# Patient Record
Sex: FEMALE | Race: WHITE | NOT HISPANIC OR LATINO | URBAN - METROPOLITAN AREA
[De-identification: names, ages, dates, MRNs, and addresses within clinical notes are randomized per-mention and may not be internally consistent; named-entity substitution may affect disease eponyms.]

---

## 2019-06-01 ENCOUNTER — OUTPATIENT (OUTPATIENT)
Dept: OUTPATIENT SERVICES | Facility: HOSPITAL | Age: 84
LOS: 1 days | End: 2019-06-01
Payer: MEDICARE

## 2019-06-01 PROCEDURE — G9001: CPT

## 2019-06-02 ENCOUNTER — INPATIENT (INPATIENT)
Facility: HOSPITAL | Age: 84
LOS: 2 days | Discharge: HOME | End: 2019-06-05
Attending: FAMILY MEDICINE | Admitting: FAMILY MEDICINE
Payer: MEDICARE

## 2019-06-02 VITALS
RESPIRATION RATE: 18 BRPM | WEIGHT: 100.09 LBS | TEMPERATURE: 98 F | HEART RATE: 65 BPM | SYSTOLIC BLOOD PRESSURE: 95 MMHG | OXYGEN SATURATION: 100 % | HEIGHT: 60 IN | DIASTOLIC BLOOD PRESSURE: 45 MMHG

## 2019-06-02 LAB
ALBUMIN SERPL ELPH-MCNC: 2.9 G/DL — LOW (ref 3.3–5)
ALP SERPL-CCNC: 108 U/L — SIGNIFICANT CHANGE UP (ref 40–120)
ALT FLD-CCNC: 18 U/L — SIGNIFICANT CHANGE UP (ref 12–78)
ANION GAP SERPL CALC-SCNC: 7 MMOL/L — SIGNIFICANT CHANGE UP (ref 5–17)
APPEARANCE UR: ABNORMAL
APTT BLD: 30.2 SEC — SIGNIFICANT CHANGE UP (ref 27.5–36.3)
AST SERPL-CCNC: 13 U/L — LOW (ref 15–37)
BASOPHILS # BLD AUTO: 0.04 K/UL — SIGNIFICANT CHANGE UP (ref 0–0.2)
BASOPHILS NFR BLD AUTO: 0.4 % — SIGNIFICANT CHANGE UP (ref 0–2)
BILIRUB SERPL-MCNC: 0.4 MG/DL — SIGNIFICANT CHANGE UP (ref 0.2–1.2)
BILIRUB UR-MCNC: NEGATIVE — SIGNIFICANT CHANGE UP
BUN SERPL-MCNC: 19 MG/DL — SIGNIFICANT CHANGE UP (ref 7–23)
CALCIUM SERPL-MCNC: 9.3 MG/DL — SIGNIFICANT CHANGE UP (ref 8.5–10.1)
CHLORIDE SERPL-SCNC: 104 MMOL/L — SIGNIFICANT CHANGE UP (ref 96–108)
CK SERPL-CCNC: 30 U/L — SIGNIFICANT CHANGE UP (ref 26–192)
CO2 SERPL-SCNC: 30 MMOL/L — SIGNIFICANT CHANGE UP (ref 22–31)
COLOR SPEC: YELLOW — SIGNIFICANT CHANGE UP
CREAT SERPL-MCNC: 0.78 MG/DL — SIGNIFICANT CHANGE UP (ref 0.5–1.3)
D DIMER BLD IA.RAPID-MCNC: 191 NG/ML DDU — SIGNIFICANT CHANGE UP
DIFF PNL FLD: NEGATIVE — SIGNIFICANT CHANGE UP
EOSINOPHIL # BLD AUTO: 0.11 K/UL — SIGNIFICANT CHANGE UP (ref 0–0.5)
EOSINOPHIL NFR BLD AUTO: 1.1 % — SIGNIFICANT CHANGE UP (ref 0–6)
GLUCOSE SERPL-MCNC: 81 MG/DL — SIGNIFICANT CHANGE UP (ref 70–99)
GLUCOSE UR QL: NEGATIVE MG/DL — SIGNIFICANT CHANGE UP
HCT VFR BLD CALC: 41.5 % — SIGNIFICANT CHANGE UP (ref 34.5–45)
HGB BLD-MCNC: 13.1 G/DL — SIGNIFICANT CHANGE UP (ref 11.5–15.5)
IMM GRANULOCYTES NFR BLD AUTO: 0.3 % — SIGNIFICANT CHANGE UP (ref 0–1.5)
INR BLD: 1.3 RATIO — HIGH (ref 0.88–1.16)
KETONES UR-MCNC: NEGATIVE — SIGNIFICANT CHANGE UP
LACTATE SERPL-SCNC: 2.4 MMOL/L — HIGH (ref 0.7–2)
LEUKOCYTE ESTERASE UR-ACNC: ABNORMAL
LYMPHOCYTES # BLD AUTO: 2.17 K/UL — SIGNIFICANT CHANGE UP (ref 1–3.3)
LYMPHOCYTES # BLD AUTO: 22.4 % — SIGNIFICANT CHANGE UP (ref 13–44)
MCHC RBC-ENTMCNC: 27.9 PG — SIGNIFICANT CHANGE UP (ref 27–34)
MCHC RBC-ENTMCNC: 31.6 GM/DL — LOW (ref 32–36)
MCV RBC AUTO: 88.5 FL — SIGNIFICANT CHANGE UP (ref 80–100)
MONOCYTES # BLD AUTO: 0.76 K/UL — SIGNIFICANT CHANGE UP (ref 0–0.9)
MONOCYTES NFR BLD AUTO: 7.8 % — SIGNIFICANT CHANGE UP (ref 2–14)
NEUTROPHILS # BLD AUTO: 6.59 K/UL — SIGNIFICANT CHANGE UP (ref 1.8–7.4)
NEUTROPHILS NFR BLD AUTO: 68 % — SIGNIFICANT CHANGE UP (ref 43–77)
NITRITE UR-MCNC: NEGATIVE — SIGNIFICANT CHANGE UP
NT-PROBNP SERPL-SCNC: 3081 PG/ML — HIGH (ref 0–450)
PH UR: 8 — SIGNIFICANT CHANGE UP (ref 5–8)
PLATELET # BLD AUTO: 274 K/UL — SIGNIFICANT CHANGE UP (ref 150–400)
POTASSIUM SERPL-MCNC: 4.5 MMOL/L — SIGNIFICANT CHANGE UP (ref 3.5–5.3)
POTASSIUM SERPL-SCNC: 4.5 MMOL/L — SIGNIFICANT CHANGE UP (ref 3.5–5.3)
PROT SERPL-MCNC: 6.9 GM/DL — SIGNIFICANT CHANGE UP (ref 6–8.3)
PROT UR-MCNC: NEGATIVE MG/DL — SIGNIFICANT CHANGE UP
PROTHROM AB SERPL-ACNC: 14.5 SEC — HIGH (ref 10–12.9)
RBC # BLD: 4.69 M/UL — SIGNIFICANT CHANGE UP (ref 3.8–5.2)
RBC # FLD: 16.5 % — HIGH (ref 10.3–14.5)
SODIUM SERPL-SCNC: 141 MMOL/L — SIGNIFICANT CHANGE UP (ref 135–145)
SP GR SPEC: 1.01 — SIGNIFICANT CHANGE UP (ref 1.01–1.02)
TROPONIN I SERPL-MCNC: <0.015 NG/ML — SIGNIFICANT CHANGE UP (ref 0.01–0.04)
TROPONIN I SERPL-MCNC: <0.015 NG/ML — SIGNIFICANT CHANGE UP (ref 0.01–0.04)
UROBILINOGEN FLD QL: NEGATIVE MG/DL — SIGNIFICANT CHANGE UP
WBC # BLD: 9.7 K/UL — SIGNIFICANT CHANGE UP (ref 3.8–10.5)
WBC # FLD AUTO: 9.7 K/UL — SIGNIFICANT CHANGE UP (ref 3.8–10.5)

## 2019-06-02 PROCEDURE — 99285 EMERGENCY DEPT VISIT HI MDM: CPT

## 2019-06-02 PROCEDURE — 71045 X-RAY EXAM CHEST 1 VIEW: CPT | Mod: 26

## 2019-06-02 PROCEDURE — 93010 ELECTROCARDIOGRAM REPORT: CPT

## 2019-06-02 PROCEDURE — 70450 CT HEAD/BRAIN W/O DYE: CPT | Mod: 26

## 2019-06-02 PROCEDURE — 71250 CT THORAX DX C-: CPT | Mod: 26

## 2019-06-02 RX ORDER — CEFTRIAXONE 500 MG/1
1000 INJECTION, POWDER, FOR SOLUTION INTRAMUSCULAR; INTRAVENOUS ONCE
Refills: 0 | Status: COMPLETED | OUTPATIENT
Start: 2019-06-02 | End: 2019-06-02

## 2019-06-02 RX ORDER — CEFTRIAXONE 500 MG/1
1000 INJECTION, POWDER, FOR SOLUTION INTRAMUSCULAR; INTRAVENOUS EVERY 24 HOURS
Refills: 0 | Status: DISCONTINUED | OUTPATIENT
Start: 2019-06-03 | End: 2019-06-04

## 2019-06-02 RX ORDER — VANCOMYCIN HCL 1 G
750 VIAL (EA) INTRAVENOUS ONCE
Refills: 0 | Status: COMPLETED | OUTPATIENT
Start: 2019-06-02 | End: 2019-06-02

## 2019-06-02 RX ORDER — AZITHROMYCIN 500 MG/1
500 TABLET, FILM COATED ORAL ONCE
Refills: 0 | Status: COMPLETED | OUTPATIENT
Start: 2019-06-02 | End: 2019-06-02

## 2019-06-02 RX ORDER — AZITHROMYCIN 500 MG/1
500 TABLET, FILM COATED ORAL EVERY 24 HOURS
Refills: 0 | Status: DISCONTINUED | OUTPATIENT
Start: 2019-06-03 | End: 2019-06-04

## 2019-06-02 RX ORDER — ONDANSETRON 8 MG/1
4 TABLET, FILM COATED ORAL EVERY 6 HOURS
Refills: 0 | Status: DISCONTINUED | OUTPATIENT
Start: 2019-06-02 | End: 2019-06-05

## 2019-06-02 RX ORDER — CEFTRIAXONE 500 MG/1
INJECTION, POWDER, FOR SOLUTION INTRAMUSCULAR; INTRAVENOUS
Refills: 0 | Status: DISCONTINUED | OUTPATIENT
Start: 2019-06-02 | End: 2019-06-04

## 2019-06-02 RX ORDER — AZITHROMYCIN 500 MG/1
TABLET, FILM COATED ORAL
Refills: 0 | Status: DISCONTINUED | OUTPATIENT
Start: 2019-06-02 | End: 2019-06-04

## 2019-06-02 RX ORDER — SODIUM CHLORIDE 9 MG/ML
1400 INJECTION, SOLUTION INTRAVENOUS ONCE
Refills: 0 | Status: COMPLETED | OUTPATIENT
Start: 2019-06-02 | End: 2019-06-02

## 2019-06-02 RX ORDER — CEFEPIME 1 G/1
1000 INJECTION, POWDER, FOR SOLUTION INTRAMUSCULAR; INTRAVENOUS ONCE
Refills: 0 | Status: COMPLETED | OUTPATIENT
Start: 2019-06-02 | End: 2019-06-02

## 2019-06-02 RX ORDER — SODIUM CHLORIDE 9 MG/ML
1000 INJECTION INTRAMUSCULAR; INTRAVENOUS; SUBCUTANEOUS
Refills: 0 | Status: DISCONTINUED | OUTPATIENT
Start: 2019-06-02 | End: 2019-06-02

## 2019-06-02 RX ORDER — SENNA PLUS 8.6 MG/1
2 TABLET ORAL AT BEDTIME
Refills: 0 | Status: DISCONTINUED | OUTPATIENT
Start: 2019-06-02 | End: 2019-06-05

## 2019-06-02 RX ORDER — CEFTRIAXONE 500 MG/1
INJECTION, POWDER, FOR SOLUTION INTRAMUSCULAR; INTRAVENOUS
Refills: 0 | Status: DISCONTINUED | OUTPATIENT
Start: 2019-06-02 | End: 2019-06-02

## 2019-06-02 RX ADMIN — CEFEPIME 1000 MILLIGRAM(S): 1 INJECTION, POWDER, FOR SOLUTION INTRAMUSCULAR; INTRAVENOUS at 16:17

## 2019-06-02 RX ADMIN — Medication 200 MILLIGRAM(S): at 22:58

## 2019-06-02 RX ADMIN — SODIUM CHLORIDE 1400 MILLILITER(S): 9 INJECTION, SOLUTION INTRAVENOUS at 18:15

## 2019-06-02 RX ADMIN — Medication 250 MILLIGRAM(S): at 16:16

## 2019-06-02 RX ADMIN — SODIUM CHLORIDE 1400 MILLILITER(S): 9 INJECTION, SOLUTION INTRAVENOUS at 15:45

## 2019-06-02 RX ADMIN — Medication 750 MILLIGRAM(S): at 17:30

## 2019-06-02 NOTE — H&P ADULT - NSHPPHYSICALEXAM_GEN_ALL_CORE
PHYSICAL EXAM:    Constitutional: NAD, awake and alert, well-developed  HEENT: PERR, EOMI, Normal Hearing, MMM  Neck: Soft and supple, No LAD, No JVD  Respiratory: Breath sounds decreased at bases,  No wheezing, rales or rhonchi  Cardiovascular: S1 and S2, regular rate and rhythm, no Murmurs, gallops or rubs  Gastrointestinal: Bowel Sounds normal,  soft, nontender, nondistended, no guarding, no rebound  Extremities: No peripheral edema  Vascular: 2+ peripheral pulses  Neurological: A/O x 1 ( name ) , no focal deficits  Musculoskeletal: 5/5 strength b/l upper and lower extremities  Skin: No rashes  rectal : deferred, not indicated

## 2019-06-02 NOTE — ED ADULT TRIAGE NOTE - CHIEF COMPLAINT QUOTE
Pt BIBA s/p syncope & cough, pt family states cough has worsened. Pt states she "feels warm" denies chest pain or shortness of breath.

## 2019-06-02 NOTE — ED PROVIDER NOTE - CLINICAL SUMMARY MEDICAL DECISION MAKING FREE TEXT BOX
89 y/o female PMHx of UTI s/p PNA 02/2019 BIBA from home after aide noted episode decreased alertness. ?syncope. Pt poor historian/poorly able to communicate though does occasional speak English with fluent speech. +tremors. ?rigors. Noted hypotensive upon ED arrival. Sepsis alert initiated. Plan: rectal temp, EKG, CXR, CT head, sepsis labs including PAN cultures, serum lactate, serial troponin, sepsis IV fluids, straight cath, monitor, observe, reassess.

## 2019-06-02 NOTE — ED ADULT NURSE REASSESSMENT NOTE - NS ED NURSE REASSESS COMMENT FT1
pt cleaned, turned, and repositioned at this time. pt has multiple healed ulcers to sacral area, blanchable redness to b/l buttock and sacrum and 0.5x 0.5 sacral stage I present at this time. pt calm and cooperative. resting in stretcher. no complaints. awaiting transfer to admission unit at this time.

## 2019-06-02 NOTE — H&P ADULT - ASSESSMENT
89 y/o female as per ED attending with PMHx of dementia , HTN ? if on  any medications, h/o PNA, UTIs presents to the ED BIBEMS from home s/p episode of lethargy this morning. Pt was with her aide while eating breakfast when aide noticed pt looked sluggish, lethargic and drooped at the table. +recent worsening cough. No LOC. EMS reports BP systolic in 70s. Lethargy self resolved. Now pt c/o "feeling cold". Recently admitted at [facility] in 02/2019 for PNA. Hx unobtainable, pt verbal but poor historian.  Patient seen and examined in ED, no relatives at bedside, no contact information, home phone number in the records out of the service, unable to obtain any more information.     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  - check TSH, B12  -monitor  - check orthostatics , PT evaluation    * Cough due to  Sepsis due to acute PNA , CAP   - CXR - noted, will do CT chest for better evaluation   - s/p cefepime and Vanco in ED  - last hospitalization in February   - will continue ceftriaxone and azithro  - ID consult  - mucolytics    * Hypotension due to sepsis, resolved  - s/p IV fluids resuscitation  - start diet  - monitor Bp  - elevated bnp, check 2 d echo  - repeat troponin in am    * Atrial flutter  - CHADS score - 2 , moderate risk   - cardiology consult  - not on AC , unable to obtain any further history at this time     advanced directives  - called home phone number - the number is not in service, no family members available to discuss

## 2019-06-02 NOTE — ED PROVIDER NOTE - OBJECTIVE STATEMENT
89 y/o female with PMHx of HTN, PNA, UTIs presents to the ED BIBEMS from home s/p episode of lethargy this morning. Pt was with her aide while eating breakfast when aide noticed pt looked sluggish, lethargic and drooped at the table. No LOC. EMS reports BP systolic in 70s. Lethargy self resolved. Now pt c/o "feeling cold". Recently admitted at [facility] in 02/2019 for PNA. Hx unobtainable, pt verbal but poorly communicative. 89 y/o female with PMHx of HTN, PNA, UTIs presents to the ED BIBEMS from home s/p episode of lethargy this morning. Pt was with her aide while eating breakfast when aide noticed pt looked sluggish, lethargic and drooped at the table. +recent worsening cough. No LOC. EMS reports BP systolic in 70s. Lethargy self resolved. Now pt c/o "feeling cold". Recently admitted at [facility] in 02/2019 for PNA. Hx unobtainable, pt verbal but poorly communicative.

## 2019-06-02 NOTE — ED ADULT NURSE NOTE - OBJECTIVE STATEMENT
Pt presents to ED from home, via EMS, pt alert and oriented to self, as per family pt had episode of eyes rolling back while sitting and becoming momentarily unresponsive. pt has also had a cough, pt resting in bed, neros intact, responsive to verbal stimuli, pt has home healht aid,safety maintained will continue to monitor

## 2019-06-02 NOTE — H&P ADULT - HISTORY OF PRESENT ILLNESS
89 y/o female as per ED attending with PMHx of dementia , HTN ? if on  any medications, h/o PNA, UTIs presents to the ED BIBEMS from home s/p episode of lethargy this morning. Pt was with her aide while eating breakfast when aide noticed pt looked sluggish, lethargic and drooped at the table. +recent worsening cough. No LOC. EMS reports BP systolic in 70s. Lethargy self resolved. Now pt c/o "feeling cold". Recently admitted at [facility] in 02/2019 for PNA. Hx unobtainable, pt verbal but poor historian.  Patient seen and examined in ED, no relatives at bedside, no contact information, home phone number in the records out of the service, unable to obtain any more information.     In ED -  T(F): 98.2  Max: 98.2 HR: 66 (61 - 66) BP: 120/71  (95/45 - 145/93) RR: 16  (15 - 18) SpO2: 99%  (99% - 100%)  EKG - atrial flutter , troponin x 2 neg, CT head  - moderate cerebrovascular changes , no acute stroke, CXR - slight left base infiltrate  Lactate, Blood 2.4 mmol/L  --> 2.1 s/p IV fluids, Cefepime and Vanco in ED

## 2019-06-02 NOTE — ED PROVIDER NOTE - CARE PLAN
Principal Discharge DX:	HCAP (healthcare-associated pneumonia)  Secondary Diagnosis:	Sepsis, due to unspecified organism  Secondary Diagnosis:	Hypotensive episode

## 2019-06-02 NOTE — ED PROVIDER NOTE - CARDIAC, MLM
Normal rate, regular rhythm.  Heart sounds S1, S2.  No murmurs, rubs or gallops. radial pulse likely diminished

## 2019-06-02 NOTE — ED PROVIDER NOTE - NEUROLOGICAL, MLM
awake, speaking English with no obvious aphasia, +inconsistently verbal, +poorly follows commands, +tremulous

## 2019-06-02 NOTE — ED ADULT NURSE REASSESSMENT NOTE - NS ED NURSE REASSESS COMMENT FT1
Family present at time of pt arrival but quickly left without providing information about contacting them or regarding pt hx or medications. MD isaacs

## 2019-06-02 NOTE — ED PROVIDER NOTE - PROGRESS NOTE DETAILS
Pt examined at bedside with Dr. Tyler. interview with patient's son prior to examination notes pt was eating breakfast at home with home health aid. Pt became lethargic and 911 was activated. EMS reported systolic BP in 70s enroute. Son states pt was recently admitted to [facility] in 2/2019 for PNA and UTI. Pt's son is Bernardo Mcfadden. may be contacted at [phone]. - Marvel Nj PA-C Lactate 2.4. LLL infiltrate noted on XR. UA pending. BP improved to 145/93. Will repeat lactate. Likely admission for sepsis 2/2 PNA. - Marvel Nj PA-C Dr. Tyler:  I have re-evaluated the patient's fluid status and reviewed vital signs. Clinical evaluation demonstrates an appropriate response to fluid resuscitation.

## 2019-06-02 NOTE — ED PROVIDER NOTE - MUSCULOSKELETAL, MLM
Spine appears normal, range of motion is not limited, no muscle or joint tenderness. no obvious CVA TTP. no gross deformity, good passive range of motion of large joints, no apparent pain

## 2019-06-03 LAB
ADD ON TEST-SPECIMEN IN LAB: SIGNIFICANT CHANGE UP
ANION GAP SERPL CALC-SCNC: 6 MMOL/L — SIGNIFICANT CHANGE UP (ref 5–17)
BASOPHILS # BLD AUTO: 0.03 K/UL — SIGNIFICANT CHANGE UP (ref 0–0.2)
BASOPHILS NFR BLD AUTO: 0.5 % — SIGNIFICANT CHANGE UP (ref 0–2)
BUN SERPL-MCNC: 13 MG/DL — SIGNIFICANT CHANGE UP (ref 7–23)
CALCIUM SERPL-MCNC: 9.5 MG/DL — SIGNIFICANT CHANGE UP (ref 8.5–10.1)
CHLORIDE SERPL-SCNC: 104 MMOL/L — SIGNIFICANT CHANGE UP (ref 96–108)
CHOLEST SERPL-MCNC: 179 MG/DL — SIGNIFICANT CHANGE UP (ref 10–199)
CK SERPL-CCNC: 38 U/L — SIGNIFICANT CHANGE UP (ref 26–192)
CO2 SERPL-SCNC: 28 MMOL/L — SIGNIFICANT CHANGE UP (ref 22–31)
CREAT SERPL-MCNC: 0.66 MG/DL — SIGNIFICANT CHANGE UP (ref 0.5–1.3)
CULTURE RESULTS: SIGNIFICANT CHANGE UP
D DIMER BLD IA.RAPID-MCNC: 209 NG/ML DDU — SIGNIFICANT CHANGE UP
EOSINOPHIL # BLD AUTO: 0.09 K/UL — SIGNIFICANT CHANGE UP (ref 0–0.5)
EOSINOPHIL NFR BLD AUTO: 1.4 % — SIGNIFICANT CHANGE UP (ref 0–6)
GLUCOSE SERPL-MCNC: 90 MG/DL — SIGNIFICANT CHANGE UP (ref 70–99)
HCT VFR BLD CALC: 43.5 % — SIGNIFICANT CHANGE UP (ref 34.5–45)
HDLC SERPL-MCNC: 56 MG/DL — SIGNIFICANT CHANGE UP
HGB BLD-MCNC: 13.7 G/DL — SIGNIFICANT CHANGE UP (ref 11.5–15.5)
IMM GRANULOCYTES NFR BLD AUTO: 0.3 % — SIGNIFICANT CHANGE UP (ref 0–1.5)
LACTATE SERPL-SCNC: 1.7 MMOL/L — SIGNIFICANT CHANGE UP (ref 0.7–2)
LIPID PNL WITH DIRECT LDL SERPL: 98 MG/DL — SIGNIFICANT CHANGE UP
LYMPHOCYTES # BLD AUTO: 1.96 K/UL — SIGNIFICANT CHANGE UP (ref 1–3.3)
LYMPHOCYTES # BLD AUTO: 29.9 % — SIGNIFICANT CHANGE UP (ref 13–44)
MAGNESIUM SERPL-MCNC: 2.1 MG/DL — SIGNIFICANT CHANGE UP (ref 1.6–2.6)
MCHC RBC-ENTMCNC: 28.1 PG — SIGNIFICANT CHANGE UP (ref 27–34)
MCHC RBC-ENTMCNC: 31.5 GM/DL — LOW (ref 32–36)
MCV RBC AUTO: 89.1 FL — SIGNIFICANT CHANGE UP (ref 80–100)
MONOCYTES # BLD AUTO: 0.43 K/UL — SIGNIFICANT CHANGE UP (ref 0–0.9)
MONOCYTES NFR BLD AUTO: 6.6 % — SIGNIFICANT CHANGE UP (ref 2–14)
NEUTROPHILS # BLD AUTO: 4.02 K/UL — SIGNIFICANT CHANGE UP (ref 1.8–7.4)
NEUTROPHILS NFR BLD AUTO: 61.3 % — SIGNIFICANT CHANGE UP (ref 43–77)
NT-PROBNP SERPL-SCNC: 3919 PG/ML — HIGH (ref 0–450)
PHOSPHATE SERPL-MCNC: 3.2 MG/DL — SIGNIFICANT CHANGE UP (ref 2.5–4.5)
PLATELET # BLD AUTO: 278 K/UL — SIGNIFICANT CHANGE UP (ref 150–400)
POTASSIUM SERPL-MCNC: 4.7 MMOL/L — SIGNIFICANT CHANGE UP (ref 3.5–5.3)
POTASSIUM SERPL-SCNC: 4.7 MMOL/L — SIGNIFICANT CHANGE UP (ref 3.5–5.3)
RBC # BLD: 4.88 M/UL — SIGNIFICANT CHANGE UP (ref 3.8–5.2)
RBC # FLD: 16.6 % — HIGH (ref 10.3–14.5)
SODIUM SERPL-SCNC: 138 MMOL/L — SIGNIFICANT CHANGE UP (ref 135–145)
SPECIMEN SOURCE: SIGNIFICANT CHANGE UP
TOTAL CHOLESTEROL/HDL RATIO MEASUREMENT: 3.2 RATIO — LOW (ref 3.3–7.1)
TRIGL SERPL-MCNC: 124 MG/DL — SIGNIFICANT CHANGE UP (ref 10–149)
TROPONIN I SERPL-MCNC: <0.015 NG/ML — SIGNIFICANT CHANGE UP (ref 0.01–0.04)
TSH SERPL-MCNC: 0.97 UU/ML — SIGNIFICANT CHANGE UP (ref 0.34–4.82)
VIT B12 SERPL-MCNC: 500 PG/ML — SIGNIFICANT CHANGE UP (ref 232–1245)
WBC # BLD: 6.55 K/UL — SIGNIFICANT CHANGE UP (ref 3.8–10.5)
WBC # FLD AUTO: 6.55 K/UL — SIGNIFICANT CHANGE UP (ref 3.8–10.5)

## 2019-06-03 PROCEDURE — 93306 TTE W/DOPPLER COMPLETE: CPT | Mod: 26

## 2019-06-03 PROCEDURE — 93010 ELECTROCARDIOGRAM REPORT: CPT

## 2019-06-03 RX ORDER — DILTIAZEM HCL 120 MG
60 CAPSULE, EXT RELEASE 24 HR ORAL ONCE
Refills: 0 | Status: COMPLETED | OUTPATIENT
Start: 2019-06-03 | End: 2019-06-03

## 2019-06-03 RX ADMIN — Medication 200 MILLIGRAM(S): at 05:21

## 2019-06-03 RX ADMIN — Medication 200 MILLIGRAM(S): at 22:44

## 2019-06-03 RX ADMIN — AZITHROMYCIN 255 MILLIGRAM(S): 500 TABLET, FILM COATED ORAL at 17:12

## 2019-06-03 RX ADMIN — CEFTRIAXONE 1000 MILLIGRAM(S): 500 INJECTION, POWDER, FOR SOLUTION INTRAMUSCULAR; INTRAVENOUS at 17:11

## 2019-06-03 RX ADMIN — Medication 200 MILLIGRAM(S): at 14:28

## 2019-06-03 NOTE — SWALLOW BEDSIDE ASSESSMENT ADULT - NS ASR SWALLOW FINDINGS DISCUS
Nursing/PT'S COGNITIVE DEFICITS ARE TOO SEVER FOR HER TO BE COUNSELED. Nursing/PT'S COGNITIVE DEFICITS ARE TOO SEVERE FOR HER TO BE COUNSELED.

## 2019-06-03 NOTE — SWALLOW BEDSIDE ASSESSMENT ADULT - SWALLOW EVAL: FEEDING ASSISTANCE
ASSISTANCE WAS NEEDED DUE TO FEED DUE TO THE SEVERITY OF HER BASELINE COGNITIVE DEFICITS DUE TO DEMENTIA. ASSISTANCE WAS NEEDED TO FEED DUE TO THE SEVERITY OF HER BASELINE COGNITIVE DEFICITS ASSOCIATED WITH DEMENTIA.

## 2019-06-03 NOTE — PHYSICAL THERAPY INITIAL EVALUATION ADULT - PERTINENT HX OF CURRENT PROBLEM, REHAB EVAL
Pt admitted to  secondary to lethargy and cough. Pt was admitted to [facility] 2/2019 secondary to PN.

## 2019-06-03 NOTE — PROGRESS NOTE ADULT - ASSESSMENT
89 y/o female as per ED attending with PMHx of dementia , HTN ? if on  any medications, h/o PNA, UTIs presents to the ED BIBEMS from home s/p episode of lethargy this morning. Pt was with her aide while eating breakfast when aide noticed pt looked sluggish, lethargic and drooped at the table. +recent worsening cough. No LOC. EMS reports BP systolic in 70s. Lethargy self resolved. Now pt c/o "feeling cold". Recently admitted at [facility] in 02/2019 for PNA. Hx unobtainable, pt verbal but poor historian.  Patient seen and examined in ED, no relatives at bedside, no contact information, home phone number in the records out of the service, unable to obtain any more information.     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 chf   - s/p IV fluids resuscitation  - start diet  - monitor Bp  - elevated bnp, check 2 d echo   - repeat troponin x3 neg     * Atrial flutter  - CHADS score - 2 , moderate risk   - cardiology consult  - not on AC , unable to obtain any further history at this time     advanced directives  - called home phone number - the number is not in service, no family members available to discuss

## 2019-06-03 NOTE — SWALLOW BEDSIDE ASSESSMENT ADULT - SWALLOW EVAL: CRITERIA FOR SKILLED INTERVENTION MET
DO NOT FEEL THAT ACUTE SPEECH PATHOLOGY INTERVENTION WOULD CHANGE CLINICAL MANAGEMENT/BE OF CLINICAL BENEFIT WHILE IN HOSPITAL. PT'S COGNITIVE DYSFUNCTION AND PRESBYPHAGIA ARE FELT TO BE CHRONIC PRE-EXISTING CONDITIONS. HER COMMUNICATIVE AND SWALLOWING INTEGRITY ARE FELT TO BE AT USUAL STATE/MAXIMIZED. PT WOULD NOT BE A VIABLE THERAPY CANDIDATE REGARDLESS GIVEN THE SEVERITY OF HER BASELINE COGNITIVE DEFICITS ASSOCIATED WITH DEMENTIA. GIVEN ABOVE, WILL NOT ACTIVELY FOLLOW. RECONSULT PRN.  A ;S OS DO NOT FEEL THAT ACUTE SPEECH PATHOLOGY INTERVENTION WOULD CHANGE CLINICAL MANAGEMENT/BE OF CLINICAL BENEFIT WHILE IN HOSPITAL. PT'S COGNITIVE DYSFUNCTION AND PRESBYPHAGIA ARE FELT TO BE CHRONIC PRE-EXISTING CONDITIONS. HER COMMUNICATIVE AND SWALLOWING INTEGRITY ARE FELT TO BE AT USUAL STATE/MAXIMIZED. PT WOULD NOT BE A VIABLE THERAPY CANDIDATE REGARDLESS GIVEN THE SEVERITY OF HER BASELINE COGNITIVE DEFICITS ASSOCIATED WITH DEMENTIA. GIVEN ABOVE, WILL NOT ACTIVELY FOLLOW. RECONSULT PRN.

## 2019-06-03 NOTE — SWALLOW BEDSIDE ASSESSMENT ADULT - NS SPL SWALLOW CLINIC TRIAL FT
Coarse solids were not offered given Oral Presbyphagia/many missing teeth and considering that she expressed enjoying food inventory on mechanical soft consistency diet.

## 2019-06-03 NOTE — PHYSICAL THERAPY INITIAL EVALUATION ADULT - PASSIVE RANGE OF MOTION EXAMINATION, REHAB EVAL
Bilateral shoulder flex ~ 120 degrees. Bilateral hip flex ~ 90 degrees, right knee flex/ext ~20-~WFL, left knee flex/ext ~ 25-~WFL, bilateral DF neutral. ROM grossly assessed secondary to pt's mental status. Pt resistive to some of the testing.

## 2019-06-03 NOTE — PHYSICAL THERAPY INITIAL EVALUATION ADULT - GENERAL OBSERVATIONS, REHAB EVAL
Pt found sitting OOB in chair with chair alarm. Pt not responding correctly to therapist's questions. Pt not following commands.

## 2019-06-03 NOTE — SWALLOW BEDSIDE ASSESSMENT ADULT - SWALLOW EVAL: RECOMMENDED DIET
SUGGEST A MECHANICAL SOFT TEXTURE DIET WITH THIN LIQUID CONSISTENCIES AS THIS DIET TEXTURE ACCOMMODATES PT'S MANY MISSING TEETH/PRESBYPHAGIA. FURTHER, SHE SEEMED TO ENJOY FOOD SELECTION ON DIET. SUGGEST A MECHANICAL SOFT TEXTURE DIET WITH THIN LIQUID CONSISTENCIES AS THIS DIET TEXTURE ACCOMMODATES PT'S MANY MISSING TEETH/PRESBYPHAGIA. FURTHER, SHE SEEMED TO ENJOY FOOD SELECTION ON THIS DIET.

## 2019-06-03 NOTE — PHYSICAL THERAPY INITIAL EVALUATION ADULT - LEVEL OF INDEPENDENCE: SUPINE/SIT, REHAB EVAL
Unable to fully assess OOB secondary to pt not following commands and resistive to mobility assessment.

## 2019-06-03 NOTE — SWALLOW BEDSIDE ASSESSMENT ADULT - ASPIRATION PRECAUTIONS
MAINTAIN ASPIRATION PRECAUTIONS AS A CONSERVATIVE MEASURE. NOTE THAT WHILE PRESBYPHAGIA MAY PLACE PATIENT AT A RELATIVELY HEIGHTENED RISK FOR EPISODIC ASPIRATION, SHE DID NOT APPEAR TO BE ASPIRATING ON CLINICAL EXAM./yes

## 2019-06-03 NOTE — SWALLOW BEDSIDE ASSESSMENT ADULT - COMMENTS
The pt was admitted to  with lethargy. hospital course is notable for lethargy which is improving, hypotension which is improving and cough with possible PNA. This profile is superimposed upon a history of HTN and Dementia. The patient was admitted to  with lethargy. Hospital course is notable for lethargy which is improving, hypotension which is improving and cough with possible PNA. This profile is superimposed upon a history of HTN and Dementia.

## 2019-06-03 NOTE — PROGRESS NOTE ADULT - SUBJECTIVE AND OBJECTIVE BOX
Subjective:  Patient is a 89y old  Female who presents with a chief complaint of lethargy, cough     HPI:     89 y/o female as per ED attending with PMHx of dementia , HTN ? if on  any medications, h/o PNA, UTIs presents to the ED BIBEMS from home s/p episode of lethargy this morning. Pt was with her aide while eating breakfast when aide noticed pt looked sluggish, lethargic and drooped at the table. +recent worsening cough. No LOC. EMS reports BP systolic in 70s. Lethargy self resolved. Now pt c/o "feeling cold". Recently admitted at [facility] in 2019 for PNA. Hx unobtainable, pt verbal but poor historian.  Patient seen and examined in ED, no relatives at bedside, no contact information, home phone number in the records out of the service, unable to obtain any more information.     In ED -  T(F): 98.2  Max: 98.2 HR: 66 (61 - 66) BP: 120/71  (95/45 - 145/93) RR: 16  (15 - 18) SpO2: 99%  (99% - 100%)  EKG - atrial flutter , troponin x 2 neg, CT head  - moderate cerebrovascular changes , no acute stroke, CXR - slight left base infiltrate  Lactate, Blood 2.4 mmol/L  --> 2.1 s/p IV fluids, Cefepime and Vanco in ED     6/3/19 - Patient seen and examined at bedside earlier today,  poor historian, tolerates po intake, afebrile, denies abdominal pain     Review of system- Rest of the review of system are negative except mentioned in HPI    OBJECTIVE:   T(C): 36.4 (19 @ 13:34), Max: 36.6 (19 @ 20:30)  HR: 82 (19 @ 13:34) (71 - 92)  BP: 102/73 (19 @ 13:34) (102/73 - 137/86)  RR: 17 (19 @ 13:34) (17 - 18)  SpO2: 98% (19 @ 13:34) (98% - 100%)  Wt(kg): --  Daily     Daily     PHYSICAL EXAM:  GENERAL: NAD  NERVOUS SYSTEM:  Alert & Oriented X1, non- focal exam  HEAD:  Atraumatic, Normocephalic  EYES: EOMI, PERRLA, conjunctiva and sclera clear  HEENT: Moist mucous membranes  NECK: Supple, No JVD  CHEST/LUNG: Clear to auscultation bilaterally; No rales, no rhonchi, no wheezing, or rubs  HEART: Regular rate and rhythm; No murmurs, rubs, or gallops  ABDOMEN: Soft, Nontender, Nondistended; Bowel sounds present  GENITOURINARY- Voiding, no suprapubic tenderness  EXTREMITIES:  2+ Peripheral Pulses, No clubbing, cyanosis, or edema  MUSCULOSKELETAL:- No muscle tenderness, Muscle tone normal, No joint tenderness, no Joint swelling, Joint range of motion-normal  SKIN-no rash, no lesion    LABS:                        13.7   6.55  )-----------( 278      ( 2019 10:24 )             43.5     06-03    138  |  104  |  13  ----------------------------<  90  4.7   |  28  |  0.66    Ca    9.5      2019 07:43  Phos  3.2     06-03  Mg     2.1     06-03    TPro  6.9  /  Alb  2.9<L>  /  TBili  0.4  /  DBili  x   /  AST  13<L>  /  ALT  18  /  AlkPhos  108  06-02    PT/INR - ( 2019 15:13 )   PT: 14.5 sec;   INR: 1.30 ratio         PTT - ( 2019 15:13 )  PTT:30.2 sec  CARDIAC MARKERS ( 2019 07:43 )  <0.015 ng/mL / x     / 38 U/L / x     / x      CARDIAC MARKERS ( 2019 18:10 )  <0.015 ng/mL / x     / 30 U/L / x     / x      CARDIAC MARKERS ( 2019 15:13 )  <0.015 ng/mL / x     / x     / x     / x          Urinalysis Basic - ( 2019 16:11 )    Color: Yellow / Appearance: Slightly Turbid / S.010 / pH: x  Gluc: x / Ketone: Negative  / Bili: Negative / Urobili: Negative mg/dL   Blood: x / Protein: Negative mg/dL / Nitrite: Negative   Leuk Esterase: Trace / RBC: 0-2 /HPF / WBC 0-2   Sq Epi: x / Non Sq Epi: Few / Bacteria: Moderate        CAPILLARY BLOOD GLUCOSE            RECENT CULTURES:    RADIOLOGY & ADDITIONAL TESTS:  < from: CT Chest No Cont (19 @ 19:50) >   Lungs: Mild pulmonary fibrosis predominating at the lung bases. Bandlike   focus   of atelectasis or scarring within the posterior aspect of the right upper   lobe.   No pulmonary mass or consolidation.    Pleural space: Normal. No pneumothorax. No pleural effusion.    Heart: Moderate cardiomegaly.    Aorta: Normal caliber aorta. Calcific plaque formation within major   arteries.    Lymph nodes: Unremarkable. No enlarged lymph nodes.    Bones/joints: Unremarkable. No acute fracture.    Soft tissues: Unremarkable.     IMPRESSION:   Mild pulmonary fibrosis. No evidence of   pneumonia or edema.      < end of copied text >  < from: CT Head No Cont (19 @ 16:45) >  IMPRESSION:  Moderate chronic microvascular changes without evidence of   an acute transcortical infarction or hemorrhage. MR is a more sensitive   imaging modality for the evaluation of an acute infarction.     < end of copied text >    Current medications:  aluminum hydroxide/magnesium hydroxide/simethicone Suspension 30 milliLiter(s) Oral every 4 hours PRN  azithromycin  IVPB      azithromycin  IVPB 500 milliGRAM(s) IV Intermittent every 24 hours  cefTRIAXone Injectable. 1000 milliGRAM(s) IV Push every 24 hours  cefTRIAXone Injectable.      guaiFENesin    Syrup 200 milliGRAM(s) Oral every 8 hours  ondansetron Injectable 4 milliGRAM(s) IV Push every 6 hours PRN  senna 2 Tablet(s) Oral at bedtime PRN

## 2019-06-03 NOTE — SWALLOW BEDSIDE ASSESSMENT ADULT - SLP GENERAL OBSERVATIONS
On encounter, a loss of bulk was evident in strap muscle regions.  The pt is alert and interactive but distractible/fidgety. She was able to verbalize during communicative probes. At these times, her motor speech abilities were grossly functional and her utterances were linguistically intact. However, pt's verbalizations were often tangential/typically contextually inappropriate c/w baseline Cognitive Dysfunction due to Dementia.

## 2019-06-03 NOTE — CONSULT NOTE ADULT - ASSESSMENT
89 y/o female  with PMHx of dementia , HTN admitted on 6/2 for evaluation of increased lethargy, drooping over table, cough; patient recently hospitalized at [facility] for pneumonia; history per medical record as patient unable to provide history. Sitting in chair, appears comfortable, awake, alert.   1. Patient admitted with change in mental status, unclear etiology, possibly microaspiration?  - follow up cultures   - iv hydration and supportive care   - serial cbc and monitor temperature   - oxygen and nebs as needed   - reviewed prior medical records to evaluate for resistant or atypical pathogens   - will continue ceftriaxone and zithromax as ordered pending cultures  - will probably transition to oral antibiotics in next 24 hours  2. other issues; per medicine

## 2019-06-03 NOTE — SWALLOW BEDSIDE ASSESSMENT ADULT - SWALLOW EVAL: DIAGNOSIS
1) The pt demonstrates periodically reduced orientation to feeding atop mild functional Oropharyngeal Presbyphagia with oral presbyphagic features that may appear heightened with foods that require mastication due to many missing teeth and/or when dementia sequel heighten. With that being stated, the patient does demonstrate sufficient oropharyngeal swallowing preservation for age(89) when in an alert state. While Presbyphagia may place pt at a relatively heightened risk for episodic aspiration, she did NOT appear to be aspirating on clinical exam. Oropharyngeal swallowing integrity is felt to be at usual state/maximized.  2) The pt is alert and interactive but distractible/fidgety. She was able to verbalize during communicative probes. At these times, her motor speech abilities were grossly functional and her utterances were linguistically intact. However, pt's verbalizations were often tangential/typically contextually inappropriate c/w baseline Cognitive Dysfunction due to Dementia.

## 2019-06-03 NOTE — SWALLOW BEDSIDE ASSESSMENT ADULT - ORAL PREPARATORY PHASE
Pt was distractible and fidgety when PO was offered but she did willingly accept PO. Labial grading on utensils was functional.

## 2019-06-03 NOTE — SWALLOW BEDSIDE ASSESSMENT ADULT - ORAL PHASE
Bolus formation/transfer were timely to mildly/moderately prolonged but mechanically functional with the above PO types. Oral processing was functional for age. Moreover,  the patient was able to clear oral debris on her own after age acceptable piecemeal deglutition. Bolus formation/transfer were timely to mildly/moderately prolonged but mechanically functional with the above PO types. Oral processing was considered to be functional for age. Moreover, the patient was able to clear oral debris on her own after age acceptable piecemeal deglutition.

## 2019-06-03 NOTE — PHYSICAL THERAPY INITIAL EVALUATION ADULT - DIAGNOSIS, PT EVAL
Encephalopathy metabolic vs progression of dementia. Near syncope can be due to hypoxia vs orthostatic hypotension, cough due to sepsis due to PN, hypotension.

## 2019-06-03 NOTE — CHART NOTE - NSCHARTNOTEFT_GEN_A_CORE
Calleed by RN about pt's son who dropped of pt's home medication. Pt without med reconciliation on admission, no noted meds in outpt med chart. Per note given by son, pt takes the following meds at home:    Diltiazem 60mg 4x/day, before meals  Synthroid 100mcg daily  Metoprolol succinate 25mg daily  Donepezil HCL 5mg daily  Trazadone (dose not reported; unknown by daughter who was contacted by RN)    Pt currently with unstable vitals (, /80), pt without complaint of chest-related sxs  Vital Signs Last 24 Hrs  T(C): 36.3 (03 Jun 2019 22:09), Max: 36.4 (03 Jun 2019 05:09)  T(F): 97.3 (03 Jun 2019 22:09), Max: 97.6 (03 Jun 2019 05:09)  HR: 110 (03 Jun 2019 22:09) (71 - 110)  BP: 161/80 (03 Jun 2019 22:09) (102/73 - 161/80)  RR: 18 (03 Jun 2019 22:09) (17 - 18)  SpO2: 98% (03 Jun 2019 22:09) (98% - 100%)      Given elevated HR, BP, will give x1 dose of Diltiazem 60mg   - Consider reconciling meds in the AM, as per day hospitalist

## 2019-06-03 NOTE — CONSULT NOTE ADULT - SUBJECTIVE AND OBJECTIVE BOX
Patient is a 89y old  Female who presents with a chief complaint of lethargy, cough (2019 19:19)    HPI:  89 y/o female  with PMHx of dementia , HTN admitted on  for evaluation of increased lethargy, drooping over table, cough; patient recently hospitalized at [facility] for pneumonia; history per medical record as patient unable to provide history. Sitting in chair, appears comfortable, awake, alert.         PMH: as above  PSH: as above  Meds: per reconciliation sheet, noted below  MEDICATIONS  (STANDING):  azithromycin  IVPB 500 milliGRAM(s) IV Intermittent every 24 hours  azithromycin  IVPB      cefTRIAXone Injectable. 1000 milliGRAM(s) IV Push every 24 hours  cefTRIAXone Injectable.      guaiFENesin    Syrup 200 milliGRAM(s) Oral every 8 hours    MEDICATIONS  (PRN):  aluminum hydroxide/magnesium hydroxide/simethicone Suspension 30 milliLiter(s) Oral every 4 hours PRN Dyspepsia  ondansetron Injectable 4 milliGRAM(s) IV Push every 6 hours PRN Nausea  senna 2 Tablet(s) Oral at bedtime PRN Constipation    Allergies    No Known Allergies    Intolerances      Social: no smoking, no alcohol, no illegal drugs; no recent travel, no exposure to TB  FAMILY HISTORY:  No pertinent family history in first degree relatives     no history of premature cardiovascular disease in first degree relatives  ROS: unable to obtain secondary to patient medical condition     Vital Signs Last 24 Hrs  T(C): 36.4 (2019 05:09), Max: 36.8 (2019 17:30)  T(F): 97.6 (2019 05:09), Max: 98.2 (2019 17:30)  HR: 71 (2019 05:09) (61 - 666)  BP: 137/86 (2019 05:09) (95/45 - 145/93)  BP(mean): 71 (2019 15:40) (71 - 71)  RR: 18 (2019 05:09) (15 - 18)  SpO2: 100% (2019 05:09) (99% - 100%)  Daily Height in cm: 152.4 (2019 14:52)    Daily     PE:    Constitutional: frail looking  HEENT: NC/AT, EOMI, PERRLA, conjunctivae clear; ears and nose atraumatic; pharynx clear  Neck: supple; thyroid not palpable  Respiratory: respiratory effort normal; clear to auscultation  Cardiovascular: S1S2 regular, no murmurs  Abdomen: soft, not tender, not distended, positive BS; no liver or spleen organomegaly  Genitourinary: no suprapubic tenderness  Musculoskeletal: no muscle tenderness, no joint swelling or tenderness  Neurological/ Psychiatric:  moving all extremities  Skin: no rashes; no palpable lesions    Labs: all available labs reviewed                        13.7   6.55  )-----------( 278      ( 2019 10:24 )             43.5     06-03    138  |  104  |  13  ----------------------------<  90  4.7   |  28  |  0.66    Ca    9.5      2019 07:43  Phos  3.2     06-03  Mg     2.1     06-03    TPro  6.9  /  Alb  2.9<L>  /  TBili  0.4  /  DBili  x   /  AST  13<L>  /  ALT  18  /  AlkPhos  108  06-02     LIVER FUNCTIONS - ( 2019 15:13 )  Alb: 2.9 g/dL / Pro: 6.9 gm/dL / ALK PHOS: 108 U/L / ALT: 18 U/L / AST: 13 U/L / GGT: x           Urinalysis Basic - ( 2019 16:11 )    Color: Yellow / Appearance: Slightly Turbid / S.010 / pH: x  Gluc: x / Ketone: Negative  / Bili: Negative / Urobili: Negative mg/dL   Blood: x / Protein: Negative mg/dL / Nitrite: Negative   Leuk Esterase: Trace / RBC: 0-2 /HPF / WBC 0-2   Sq Epi: x / Non Sq Epi: Few / Bacteria: Moderate    < from: CT Chest No Cont (19 @ 19:50) >    EXAM:  CT CHEST                            PROCEDURE DATE:  2019          INTERPRETATION:  EXAM:    CT Chest Without Contrast     EXAM DATE/TIME:    2019 7:46 PM     CLINICAL HISTORY:   89-year-old female with abnormal findings on chest x-ray. Cough.    TECHNIQUE:    Imaging protocol: Axial computed tomography images of the chest without   intravenous contrast. Coronal and sagittal reformatted images were   created and   reviewed.     COMPARISON:    CR XR CHEST IMMEDIATE 2019 3:20PM     FINDINGS:    Lungs: Mild pulmonary fibrosis predominating at the lung bases. Bandlike   focus   of atelectasis or scarring within the posterior aspect of the right upper   lobe.   No pulmonary mass or consolidation.    Pleural space: Normal. No pneumothorax. No pleural effusion.    Heart: Moderate cardiomegaly.    Aorta: Normal caliber aorta. Calcific plaque formation within major   arteries.    Lymph nodes: Unremarkable. No enlarged lymph nodes.    Bones/joints: Unremarkable. No acute fracture.    Soft tissues: Unremarkable.     IMPRESSION:   Mild pulmonary fibrosis. No evidence of   pneumonia or edema.    < end of copied text >        Radiology: all available radiological tests reviewed    Advanced directives addressed: full resuscitation

## 2019-06-03 NOTE — SWALLOW BEDSIDE ASSESSMENT ADULT - PHARYNGEAL PHASE
Swallow triggered in an acceptable time frame for age. laryngeal lift on palpation during swallowing trials was very mildly reduced but felt to be functional for age. NO behavioral aspiration signs exhibited on exam. Odynophagia was denied. Swallow triggered in an acceptable time frame for age. Laryngeal lift on palpation during swallowing trials was very mildly reduced but felt to be functional for age. NO behavioral aspiration signs exhibited on exam. Odynophagia was denied.

## 2019-06-04 PROCEDURE — 93010 ELECTROCARDIOGRAM REPORT: CPT

## 2019-06-04 RX ORDER — LEVOTHYROXINE SODIUM 125 MCG
1 TABLET ORAL
Qty: 0 | Refills: 0 | DISCHARGE

## 2019-06-04 RX ORDER — DILTIAZEM HCL 120 MG
1 CAPSULE, EXT RELEASE 24 HR ORAL
Qty: 0 | Refills: 0 | DISCHARGE

## 2019-06-04 RX ORDER — APIXABAN 2.5 MG/1
1 TABLET, FILM COATED ORAL
Qty: 0 | Refills: 0 | DISCHARGE

## 2019-06-04 RX ORDER — ASPIRIN/CALCIUM CARB/MAGNESIUM 324 MG
81 TABLET ORAL DAILY
Refills: 0 | Status: DISCONTINUED | OUTPATIENT
Start: 2019-06-04 | End: 2019-06-05

## 2019-06-04 RX ORDER — APIXABAN 2.5 MG/1
2.5 TABLET, FILM COATED ORAL EVERY 12 HOURS
Refills: 0 | Status: DISCONTINUED | OUTPATIENT
Start: 2019-06-04 | End: 2019-06-05

## 2019-06-04 RX ORDER — LEVOTHYROXINE SODIUM 125 MCG
100 TABLET ORAL DAILY
Refills: 0 | Status: DISCONTINUED | OUTPATIENT
Start: 2019-06-04 | End: 2019-06-05

## 2019-06-04 RX ORDER — TRAZODONE HCL 50 MG
1 TABLET ORAL
Qty: 0 | Refills: 0 | DISCHARGE

## 2019-06-04 RX ORDER — DONEPEZIL HYDROCHLORIDE 10 MG/1
1 TABLET, FILM COATED ORAL
Qty: 0 | Refills: 0 | DISCHARGE

## 2019-06-04 RX ORDER — METOPROLOL TARTRATE 50 MG
25 TABLET ORAL DAILY
Refills: 0 | Status: DISCONTINUED | OUTPATIENT
Start: 2019-06-04 | End: 2019-06-04

## 2019-06-04 RX ORDER — DILTIAZEM HCL 120 MG
9 CAPSULE, EXT RELEASE 24 HR ORAL ONCE
Refills: 0 | Status: COMPLETED | OUTPATIENT
Start: 2019-06-04 | End: 2019-06-04

## 2019-06-04 RX ORDER — METOPROLOL TARTRATE 50 MG
50 TABLET ORAL DAILY
Refills: 0 | Status: DISCONTINUED | OUTPATIENT
Start: 2019-06-04 | End: 2019-06-05

## 2019-06-04 RX ORDER — ASPIRIN/CALCIUM CARB/MAGNESIUM 324 MG
1 TABLET ORAL
Qty: 0 | Refills: 0 | DISCHARGE

## 2019-06-04 RX ORDER — DONEPEZIL HYDROCHLORIDE 10 MG/1
5 TABLET, FILM COATED ORAL AT BEDTIME
Refills: 0 | Status: DISCONTINUED | OUTPATIENT
Start: 2019-06-04 | End: 2019-06-05

## 2019-06-04 RX ORDER — METOPROLOL TARTRATE 50 MG
25 TABLET ORAL ONCE
Refills: 0 | Status: COMPLETED | OUTPATIENT
Start: 2019-06-04 | End: 2019-06-04

## 2019-06-04 RX ORDER — TRAZODONE HCL 50 MG
25 TABLET ORAL AT BEDTIME
Refills: 0 | Status: DISCONTINUED | OUTPATIENT
Start: 2019-06-04 | End: 2019-06-05

## 2019-06-04 RX ORDER — METOPROLOL TARTRATE 50 MG
1 TABLET ORAL
Qty: 0 | Refills: 0 | DISCHARGE

## 2019-06-04 RX ADMIN — Medication 60 MILLIGRAM(S): at 00:06

## 2019-06-04 RX ADMIN — APIXABAN 2.5 MILLIGRAM(S): 2.5 TABLET, FILM COATED ORAL at 18:44

## 2019-06-04 RX ADMIN — DONEPEZIL HYDROCHLORIDE 5 MILLIGRAM(S): 10 TABLET, FILM COATED ORAL at 21:43

## 2019-06-04 RX ADMIN — Medication 81 MILLIGRAM(S): at 11:59

## 2019-06-04 RX ADMIN — Medication 100 MICROGRAM(S): at 12:00

## 2019-06-04 RX ADMIN — APIXABAN 2.5 MILLIGRAM(S): 2.5 TABLET, FILM COATED ORAL at 11:59

## 2019-06-04 RX ADMIN — Medication 25 MILLIGRAM(S): at 18:47

## 2019-06-04 RX ADMIN — Medication 200 MILLIGRAM(S): at 21:42

## 2019-06-04 RX ADMIN — Medication 50 MILLIGRAM(S): at 16:46

## 2019-06-04 NOTE — CONSULT NOTE ADULT - ASSESSMENT
Atrial flutter - poorly rate controlled.  will give one dose of metoprolol 25mg now.  will increase toprol to 50mg daily.  continue Elliquis 2.5mg po BID.  Discussed plan with daughter at length.    DIzziness- resolved.  no altered mental status now.  Management per primary team.     HTN- continue meds as above.    Other medical issues- Management per primary team.   Thank you for allowing me to participate in the care of this patient. Please feel free to contact me with any questions.

## 2019-06-04 NOTE — DIETITIAN INITIAL EVALUATION ADULT. - OTHER INFO
89 y/o female as per ED attending with PMHx of dementia , HTN ? if on  any medications, h/o PNA, UTIs presents to the ED BIBEMS from home s/p episode of lethargy this morning. Pt was with her aide while eating breakfast when aide noticed pt looked sluggish, lethargic and drooped at the table. +recent worsening cough. No LOC. EMS reports BP systolic in 70s. Lethargy self resolved. Now pt c/o "feeling cold". Recently admitted at [facility] in 02/2019 for PNA. Hx unobtainable, pt verbal but poor historian. Patient seen and examined in ED, no relatives at bedside, no contact information, home phone number in the records out of the service, unable to obtain any more information. LOS. Pt is 89 y/o female as per ED attending with PMHx of dementia , HTN ? if on  any medications, h/o PNA, UTIs presents to the ED BIBEMS from home s/p episode of lethargy this morning. Pt was with her aide while eating breakfast when aide noticed pt looked sluggish, lethargic and drooped at the table. +recent worsening cough. No LOC. EMS reports BP systolic in 70s. Lethargy self resolved. Now pt c/o "feeling cold". Recently admitted at [facility] in 02/2019 for PNA. Hx unobtainable, pt verbal but poor historian. Patient seen and examined in ED, no relatives at bedside, no contact information, home phone number in the records out of the service, unable to obtain any more information. Pt did not answer any of this RDs questions, except for saying "No", when asked if she was eating.   Pt meets criteria for severe protein-calorie malnutrition in context of chronic disease.  NFPE shows clears signs of muscle wasting over entire body, fat wasting over entire body. BMI is 14.7.  2 healed PUs, and blanchable redness over bilateral buttocks.  Suggest maintain current mechanical soft diet, thin liquids.  Add Ensure Enlive 8 oz tid.  Add Gelatein BID.  Record PO intake in EMR after each meal (nursing.) Total feed. Palliative consult may be in order, nutrition support if PO intake not over 50% of needs in next few days, if medically feasible and warranted.  Will monitor PO intake, tolerance, labs and weight.

## 2019-06-04 NOTE — PROGRESS NOTE ADULT - SUBJECTIVE AND OBJECTIVE BOX
12 hr chart check   Subjective:  Patient is a 89y old  Female who presents with a chief complaint of lethargy, cough     HPI:     89 y/o female as per ED attending with PMHx of dementia , HTN ? if on  any medications, h/o PNA, UTIs presents to the ED BIBEMS from home s/p episode of lethargy this morning. Pt was with her aide while eating breakfast when aide noticed pt looked sluggish, lethargic and drooped at the table. +recent worsening cough. No LOC. EMS reports BP systolic in 70s. Lethargy self resolved. Now pt c/o "feeling cold". Recently admitted at [facility] in 2019 for PNA. Hx unobtainable, pt verbal but poor historian.  Patient seen and examined in ED, no relatives at bedside, no contact information, home phone number in the records out of the service, unable to obtain any more information.     In ED -  T(F): 98.2  Max: 98.2 HR: 66 (61 - 66) BP: 120/71  (95/45 - 145/93) RR: 16  (15 - 18) SpO2: 99%  (99% - 100%)  EKG - atrial flutter , troponin x 2 neg, CT head  - moderate cerebrovascular changes , no acute stroke, CXR - slight left base infiltrate  Lactate, Blood 2.4 mmol/L  --> 2.1 s/p IV fluids, Cefepime and Vanco in ED     6/3/19 - Patient seen and examined at bedside earlier today,  poor historian, tolerates po intake, afebrile, denies abdominal pain   19 - pt seen and examined, denies cp, events noted - rapid a fib overnight, tele - HR stable,  poor historian  Review of system- Rest of the review of system are negative except mentioned in HPI    OBJECTIVE:   T(C): 36.7 (19 @ 20:19), Max: 36.7 (19 @ 03:27)  T(F): 98.1 (19 @ 20:19), Max: 98.1 (19 @ 20:19)  HR: 133 (19 @ 20:19) (67 - 136)  BP: 125/74 (19 @ 20:19) (100/86 - 161/80)  RR: 18 (19 @ 20:19) (17 - 18)  SpO2: 99% (19 @ 20:19) (96% - 99%)  Wt(kg): --    PHYSICAL EXAM:  GENERAL: NAD  NERVOUS SYSTEM:  Alert & Oriented X1, non- focal exam  HEAD:  Atraumatic, Normocephalic  EYES: EOMI, PERRLA, conjunctiva and sclera clear  HEENT: Moist mucous membranes  NECK: Supple, No JVD  CHEST/LUNG: Clear to auscultation bilaterally; No rales, no rhonchi, no wheezing, or rubs  HEART: Regular rate and rhythm; No murmurs, rubs, or gallops  ABDOMEN: Soft, Nontender, Nondistended; Bowel sounds present  GENITOURINARY- Voiding, no suprapubic tenderness  EXTREMITIES:  2+ Peripheral Pulses, No clubbing, cyanosis, or edema  MUSCULOSKELETAL:- No muscle tenderness, Muscle tone normal, No joint tenderness, no Joint swelling, Joint range of motion-normal  SKIN-no rash, no lesion    LABS:      138  |  104  |  13  ----------------------------<  90  4.7   |  28  |  0.66    Ca    9.5      2019 07:43  Phos  3.2     06-03  Mg     2.1     06-03                         13.7   6.55  )-----------( 278      ( 2019 10:24 )             43.5       CARDIAC MARKERS ( 2019 07:43 )  <0.015 ng/mL / x     / 38 U/L / x     / x                                            13.7   6.55  )-----------( 278      ( 2019 10:24 )             43.5     -03    138  |  104  |  13  ----------------------------<  90  4.7   |  28  |  0.66    Ca    9.5      2019 07:43  Phos  3.2     06-03  Mg     2.1     06-03    TPro  6.9  /  Alb  2.9<L>  /  TBili  0.4  /  DBili  x   /  AST  13<L>  /  ALT  18  /  AlkPhos  108  06-02    PT/INR - ( 2019 15:13 )   PT: 14.5 sec;   INR: 1.30 ratio         PTT - ( 2019 15:13 )  PTT:30.2 sec  CARDIAC MARKERS ( 2019 07:43 )  <0.015 ng/mL / x     / 38 U/L / x     / x      CARDIAC MARKERS ( 2019 18:10 )  <0.015 ng/mL / x     / 30 U/L / x     / x      CARDIAC MARKERS ( 2019 15:13 )  <0.015 ng/mL / x     / x     / x     / x          Urinalysis Basic - ( 2019 16:11 )    Color: Yellow / Appearance: Slightly Turbid / S.010 / pH: x  Gluc: x / Ketone: Negative  / Bili: Negative / Urobili: Negative mg/dL   Blood: x / Protein: Negative mg/dL / Nitrite: Negative   Leuk Esterase: Trace / RBC: 0-2 /HPF / WBC 0-2   Sq Epi: x / Non Sq Epi: Few / Bacteria: Moderate        CAPILLARY BLOOD GLUCOSE            RECENT CULTURES:    RADIOLOGY & ADDITIONAL TESTS:  < from: CT Chest No Cont (19 @ 19:50) >   Lungs: Mild pulmonary fibrosis predominating at the lung bases. Bandlike   focus   of atelectasis or scarring within the posterior aspect of the right upper   lobe.   No pulmonary mass or consolidation.    Pleural space: Normal. No pneumothorax. No pleural effusion.    Heart: Moderate cardiomegaly.    Aorta: Normal caliber aorta. Calcific plaque formation within major   arteries.    Lymph nodes: Unremarkable. No enlarged lymph nodes.    Bones/joints: Unremarkable. No acute fracture.    Soft tissues: Unremarkable.     IMPRESSION:   Mild pulmonary fibrosis. No evidence of   pneumonia or edema.      < end of copied text >  < from: CT Head No Cont (19 @ 16:45) >  IMPRESSION:  Moderate chronic microvascular changes without evidence of   an acute transcortical infarction or hemorrhage. MR is a more sensitive   imaging modality for the evaluation of an acute infarction.     < end of copied text >    Current medications:  aluminum hydroxide/magnesium hydroxide/simethicone Suspension 30 milliLiter(s) Oral every 4 hours PRN  azithromycin  IVPB      azithromycin  IVPB 500 milliGRAM(s) IV Intermittent every 24 hours  cefTRIAXone Injectable. 1000 milliGRAM(s) IV Push every 24 hours  cefTRIAXone Injectable.      guaiFENesin    Syrup 200 milliGRAM(s) Oral every 8 hours  ondansetron Injectable 4 milliGRAM(s) IV Push every 6 hours PRN  senna 2 Tablet(s) Oral at bedtime PRN

## 2019-06-04 NOTE — PROGRESS NOTE ADULT - ASSESSMENT
89 y/o female as per ED attending with PMHx of dementia , HTN ? if on  any medications, h/o PNA, UTIs presents to the ED BIBEMS from home s/p episode of lethargy this morning. Pt was with her aide while eating breakfast when aide noticed pt looked sluggish, lethargic and drooped at the table. +recent worsening cough. No LOC. EMS reports BP systolic in 70s. Lethargy self resolved. Now pt c/o "feeling cold". Recently admitted at [facility] in 02/2019 for PNA. Hx unobtainable, pt verbal but poor historian.  Patient seen and examined in ED, no relatives at bedside, no contact information, home phone number in the records out of the service, unable to obtain any more information.     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 will stop IV abx  - last hospitalization in February  - ID consult  - mucolytics    * Hypotension due to sepsis, resolved  * Elevated BNP ? chf   - s/p IV fluids resuscitation  - start diet  - monitor Bp  - elevated bnp, check 2 d echo  EF 60%   - repeat troponin x3 neg     * Atrial flutter, uncotrolled  - CHADS score - 2 , moderate risk   - c/w Eliquis  - c/w BB high dose as per cardiology   - cardiology consult    HCP - giuliano Colvin [phone]  Dispo - if HR well controlled , d/c home

## 2019-06-04 NOTE — PROGRESS NOTE ADULT - SUBJECTIVE AND OBJECTIVE BOX
Patient is a 89y old  Female who presents with a chief complaint of lethargy, cough (02 Jun 2019 19:19)    Date of service: 06-04-19 @ 11:10    Patient lying in bed; afebrile  No complaints        ROS unable to obtain secondary to patient medical condition     MEDICATIONS  (STANDING):  guaiFENesin    Syrup 200 milliGRAM(s) Oral every 8 hours    MEDICATIONS  (PRN):  aluminum hydroxide/magnesium hydroxide/simethicone Suspension 30 milliLiter(s) Oral every 4 hours PRN Dyspepsia  ondansetron Injectable 4 milliGRAM(s) IV Push every 6 hours PRN Nausea  senna 2 Tablet(s) Oral at bedtime PRN Constipation      Vital Signs Last 24 Hrs  T(C): 36.7 (04 Jun 2019 05:53), Max: 36.7 (04 Jun 2019 03:27)  T(F): 98 (04 Jun 2019 05:53), Max: 98 (04 Jun 2019 03:27)  HR: 69 (04 Jun 2019 05:53) (67 - 136)  BP: 103/79 (04 Jun 2019 05:53) (100/86 - 161/80)  BP(mean): 90 (04 Jun 2019 03:27) (90 - 90)  RR: 18 (04 Jun 2019 05:53) (17 - 18)  SpO2: 98% (04 Jun 2019 05:53) (98% - 99%)    Physical Exam:        PE:    Constitutional: frail looking  HEENT: NC/AT, EOMI, PERRLA, conjunctivae clear; ears and nose atraumatic; pharynx clear  Neck: supple; thyroid not palpable  Respiratory: respiratory effort normal; clear to auscultation  Cardiovascular: S1S2 regular, no murmurs  Abdomen: soft, not tender, not distended, positive BS; no liver or spleen organomegaly  Genitourinary: no suprapubic tenderness  Musculoskeletal: no muscle tenderness, no joint swelling or tenderness  Neurological/ Psychiatric:  moving all extremities  Skin: no rashes; no palpable lesions    Labs: all available labs reviewed                     Labs:                        13.7   6.55  )-----------( 278      ( 03 Jun 2019 10:24 )             43.5     06-03    138  |  104  |  13  ----------------------------<  90  4.7   |  28  |  0.66    Ca    9.5      03 Jun 2019 07:43  Phos  3.2     06-03  Mg     2.1     06-03    TPro  6.9  /  Alb  2.9<L>  /  TBili  0.4  /  DBili  x   /  AST  13<L>  /  ALT  18  /  AlkPhos  108  06-02           Cultures:       Culture - Urine (collected 06-02-19 @ 16:11)  Source: .Urine None  Final Report (06-03-19 @ 12:57):    <10,000 CFU/mL Normal Urogenital Candie    Culture - Blood (collected 06-02-19 @ 16:11)  Source: .Blood None  Preliminary Report (06-03-19 @ 20:01):    No growth to date.    Culture - Blood (collected 06-02-19 @ 15:13)  Source: .Blood None  Preliminary Report (06-03-19 @ 20:01):    No growth to date.            < from: CT Chest No Cont (06.02.19 @ 19:50) >    EXAM:  CT CHEST                            PROCEDURE DATE:  06/02/2019          INTERPRETATION:  EXAM:    CT Chest Without Contrast     EXAM DATE/TIME:    6/2/2019 7:46 PM     CLINICAL HISTORY:   89-year-old female with abnormal findings on chest x-ray. Cough.    TECHNIQUE:    Imaging protocol: Axial computed tomography images of the chest without   intravenous contrast. Coronal and sagittal reformatted images were   created and   reviewed.     COMPARISON:    CR XR CHEST IMMEDIATE 6/2/2019 3:20PM     FINDINGS:    Lungs: Mild pulmonary fibrosis predominating at the lung bases. Bandlike   focus   of atelectasis or scarring within the posterior aspect of the right upper   lobe.   No pulmonary mass or consolidation.    Pleural space: Normal. No pneumothorax. No pleural effusion.    Heart: Moderate cardiomegaly.    Aorta: Normal caliber aorta. Calcific plaque formation within major   arteries.    Lymph nodes: Unremarkable. No enlarged lymph nodes.    Bones/joints: Unremarkable. No acute fracture.    Soft tissues: Unremarkable.     IMPRESSION:   Mild pulmonary fibrosis. No evidence of   pneumonia or edema.    < end of copied text >        Radiology: all available radiological tests reviewed    Advanced directives addressed: full resuscitation

## 2019-06-04 NOTE — DIETITIAN INITIAL EVALUATION ADULT. - PERTINENT MEDS FT
MEDICATIONS  (STANDING):  apixaban 2.5 milliGRAM(s) Oral every 12 hours  aspirin  chewable 81 milliGRAM(s) Oral daily  donepezil 5 milliGRAM(s) Oral at bedtime  guaiFENesin    Syrup 200 milliGRAM(s) Oral every 8 hours  levothyroxine 100 MICROGram(s) Oral daily  metoprolol succinate ER 25 milliGRAM(s) Oral daily    MEDICATIONS  (PRN):  aluminum hydroxide/magnesium hydroxide/simethicone Suspension 30 milliLiter(s) Oral every 4 hours PRN Dyspepsia  ondansetron Injectable 4 milliGRAM(s) IV Push every 6 hours PRN Nausea  senna 2 Tablet(s) Oral at bedtime PRN Constipation  traZODone 25 milliGRAM(s) Oral at bedtime PRN insomnia

## 2019-06-04 NOTE — DIETITIAN INITIAL EVALUATION ADULT. - ENERGY NEEDS
Ht.    60  "        Wt.  34.6     kg               BMI   14.7               IBW 45      kg               Pt is at 76   %  IBW

## 2019-06-04 NOTE — PROGRESS NOTE ADULT - ASSESSMENT
89 y/o female  with PMHx of dementia , HTN admitted on 6/2 for evaluation of increased lethargy, drooping over table, cough; patient recently hospitalized at [facility] for pneumonia; history per medical record as patient unable to provide history. Sitting in chair, appears comfortable, awake, alert.   1. Patient admitted with change in mental status, unclear etiology, possibly microaspiration?  - follow up cultures   - iv hydration and supportive care   - serial cbc and monitor temperature   - oxygen and nebs as needed   - reviewed prior medical records to evaluate for resistant or atypical pathogens   - all cultures no growth, patient afebrile, will stop antibiotics and observe  2. other issues; per medicine

## 2019-06-04 NOTE — CONSULT NOTE ADULT - SUBJECTIVE AND OBJECTIVE BOX
Patient is a 89y old  Female who presents with a chief complaint of lethargy, cough.       HPI:  89 y/o female as per ED attending with PMHx of dementia , HTN ? if on  any medications, h/o PNA, UTIs presents to the ED BIBEMS from home s/p episode of lethargy this morning. Pt was with her aide while eating breakfast when aide noticed pt looked sluggish, lethargic and drooped at the table. +recent worsening cough. No LOC. EMS reports BP systolic in 70s. Lethargy self resolved. Now pt c/o "feeling cold". Recently admitted at [facility] in 02/2019 for PNA.     In ED -  T(F): 98.2  Max: 98.2 HR: 66 (61 - 66) BP: 120/71  (95/45 - 145/93) RR: 16  (15 - 18) SpO2: 99%  (99% - 100%)  EKG - atrial flutter , troponin x 2 neg, CT head  - moderate cerebrovascular changes , no acute stroke, CXR - slight left base infiltrate  Lactate, Blood 2.4 mmol/L  --> 2.1 s/p IV fluids, Cefepime and Vanco in ED.    Pt seen and examined by me now and she is unable to provide any history,. She is pleasantly confused now.  Appears comfortable and not in any distress.   Denies any CP or SOB.     History obtained from her daughter Judi at .   Pt has a known history of afib/flutter and on full dose anticoagulation with Elliquis as outpt.         PAST MEDICAL & SURGICAL HISTORY:  PNA (pneumonia)  HTN (hypertension)  Dementia  No significant past surgical history      MEDICATIONS  (STANDING):  apixaban 2.5 milliGRAM(s) Oral every 12 hours  aspirin  chewable 81 milliGRAM(s) Oral daily  donepezil 5 milliGRAM(s) Oral at bedtime  guaiFENesin    Syrup 200 milliGRAM(s) Oral every 8 hours  levothyroxine 100 MICROGram(s) Oral daily  metoprolol succinate ER 25 milliGRAM(s) Oral daily    MEDICATIONS  (PRN):  aluminum hydroxide/magnesium hydroxide/simethicone Suspension 30 milliLiter(s) Oral every 4 hours PRN Dyspepsia  ondansetron Injectable 4 milliGRAM(s) IV Push every 6 hours PRN Nausea  senna 2 Tablet(s) Oral at bedtime PRN Constipation  traZODone 25 milliGRAM(s) Oral at bedtime PRN insomnia      FAMILY HISTORY:  No pertinent family history in first degree relatives      SOCIAL HISTORY:   no recent smoking     REVIEW OF SYSTEMS: per HPI          Vital Signs Last 24 Hrs  T(C): 36.7 (04 Jun 2019 11:30), Max: 36.7 (04 Jun 2019 03:27)  T(F): 98 (04 Jun 2019 11:30), Max: 98 (04 Jun 2019 03:27)  HR: 95 (04 Jun 2019 11:30) (67 - 136)  BP: 122/78 (04 Jun 2019 11:30) (100/86 - 161/80)  BP(mean): 90 (04 Jun 2019 03:27) (90 - 90)  RR: 18 (04 Jun 2019 11:30) (17 - 18)  SpO2: 96% (04 Jun 2019 11:30) (96% - 99%)    PHYSICAL EXAM-    Constitutional: elderly frail and in no acute distress    Head: Head is normocephalic and atraumatic.      Neck: No jugular venous distention. No audible carotid bruits. There are strong carotid pulses bilaterally. No JVD.     Cardiovascular: irregular rate and tachycardic    Respiratory: Breath sounds are normal. No rales. No wheezing.    Abdomen: Soft, nontender, nondistended with positive bowel sounds.      Extremity: No tenderness. No  pitting edema     Neurologic: The patient is alert and confused    Skin: No rash, no obvious lesions noted.      Psychiatric: The patient appears to be emotionally stable.      INTERPRETATION OF TELEMETRY: atrial flutter 110  /min    ECG: atrial flutter, L axis.     I&O's Detail      LABS:                        13.7   6.55  )-----------( 278      ( 03 Jun 2019 10:24 )             43.5     06-03    138  |  104  |  13  ----------------------------<  90  4.7   |  28  |  0.66    Ca    9.5      03 Jun 2019 07:43  Phos  3.2     06-03  Mg     2.1     06-03      CARDIAC MARKERS ( 03 Jun 2019 07:43 )  <0.015 ng/mL / x     / 38 U/L / x     / x      CARDIAC MARKERS ( 02 Jun 2019 18:10 )  <0.015 ng/mL / x     / 30 U/L / x     / x              I&O's Summary    BNP  RADIOLOGY & ADDITIONAL STUDIES:  < from: CT Chest No Cont (06.02.19 @ 19:50) >  EXAM:  CT CHEST                            PROCEDURE DATE:  06/02/2019          INTERPRETATION:  EXAM:    CT Chest Without Contrast     EXAM DATE/TIME:    6/2/2019 7:46 PM     CLINICAL HISTORY:   89-year-old female with abnormal findings on chest x-ray. Cough.    TECHNIQUE:    Imaging protocol: Axial computed tomography images of the chest without   intravenous contrast. Coronal and sagittal reformatted images were   created and   reviewed.     COMPARISON:    CR XR CHEST IMMEDIATE 6/2/2019 3:20PM     FINDINGS:    Lungs: Mild pulmonary fibrosis predominating at the lung bases. Bandlike   focus   of atelectasis or scarring within the posterior aspect of the right upper   lobe.   No pulmonary mass or consolidation.    Pleural space: Normal. No pneumothorax. No pleural effusion.    Heart: Moderate cardiomegaly.    Aorta: Normal caliber aorta. Calcific plaque formation within major   arteries.    Lymph nodes: Unremarkable. No enlarged lymph nodes.    Bones/joints: Unremarkable. No acute fracture.    Soft tissues: Unremarkable.     IMPRESSION:   Mild pulmonary fibrosis. No evidence of   pneumonia or edema.                ERIC TORRE   This document has been electronically signed. Derek  3 2019 10:19AM    < end of copied text >  < from: Transthoracic Echocardiogram (06.03.19 @ 22:02) >     Impression     Summary     Estimated left ventricular ejection fraction is 60-65 %.   The left ventricle is normal in size, wall thickness, wall motion and   contractility as seen in limited views.   Fibrocalcific changes noted to the mitral valve leaflets with preserved   leaflet excursion.   Mild (1+) mitral regurgitation is present.   Fibrocalcific changes noted to the Aortic valve leaflets with preserved   leaflet excursion.   Trace aortic regurgitation is present.   Normal appearing tricuspid valve structure.   Moderate (2+) tricuspid valve regurgitation is present.     Signature     ----------------------------------------------------------------   Electronically signed by Mario Arambula MD(Interpreting   physician) on 06/04/2019 03:28 PM   ----------------------------------------------------------------    < end of copied text >

## 2019-06-04 NOTE — PROVIDER CONTACT NOTE (OTHER) - BACKGROUND
reassess. After an hour, BP was 135/72 and . Patient had and ECG which showed abnormal heart rhythm.

## 2019-06-04 NOTE — CHART NOTE - NSCHARTNOTEFT_GEN_A_CORE
Upon Nutritional Assessment by the Registered Dietitian your patient was determined to meet criteria / has evidence of the following diagnosis/diagnoses:          [ ]  Mild Protein Calorie Malnutrition        [ ]  Moderate Protein Calorie Malnutrition        [ x] Severe Protein Calorie Malnutrition        [ ] Unspecified Protein Calorie Malnutrition        [ ] Underweight / BMI <19        [ ] Morbid Obesity / BMI > 40      Findings as based on:  •  Comprehensive nutrition assessment and consultation  •  Calorie counts (nutrient intake analysis)  •  Food acceptance and intake status from observations by staff  •  Follow up  •  Patient education  •  Intervention secondary to interdisciplinary rounds  •   concerns    Pt meets criteria for severe protein-calorie malnutrition in context of chronic disease.    NFPE shows clears signs of muscle wasting over entire body, fat wasting over entire body.   BMI is 14.7.    2 healed PUs, and blanchable redness over bilateral buttocks.     Treatment:    The following diet has been recommended:   Suggest maintain current mechanical soft diet, thin liquids.    Add Ensure Enlive 8 oz tid.    Add Gelatein BID.    Record PO intake in EMR after each meal (nursing.)   Total feed.   Palliative consult may be in order, nutrition support if PO intake not over 50% of needs in next few days, if medically feasible and warranted.    Monitor PO intake, tolerance, labs and weight.     PROVIDER Section:     By signing this assessment you are acknowledging and agree with the diagnosis/diagnoses assigned by the Registered Dietitian    Comments:

## 2019-06-04 NOTE — PROVIDER CONTACT NOTE (OTHER) - REASON
Consult- Cardiology
near -syncope, atrial flutter, ? heart failure
Patient has had unstable Blood Pressure and Heart rate trhoughout the night

## 2019-06-04 NOTE — PROVIDER CONTACT NOTE (OTHER) - SITUATION
OFFICE IS AWARE OF CONSULT
Patient's BP at 2209 was 161/80, MD was notified and ordered 60mg Cardizem. Before administration, VS was 109/47 and heart rate was 135. Provider again notified, and ordered to give Cardizem and to

## 2019-06-05 VITALS
SYSTOLIC BLOOD PRESSURE: 101 MMHG | HEART RATE: 68 BPM | RESPIRATION RATE: 18 BRPM | DIASTOLIC BLOOD PRESSURE: 72 MMHG | TEMPERATURE: 98 F | OXYGEN SATURATION: 99 %

## 2019-06-05 DIAGNOSIS — Z71.89 OTHER SPECIFIED COUNSELING: ICD-10-CM

## 2019-06-05 RX ORDER — METOPROLOL TARTRATE 50 MG
2 TABLET ORAL
Qty: 0 | Refills: 0 | DISCHARGE

## 2019-06-05 RX ORDER — METOPROLOL TARTRATE 50 MG
1 TABLET ORAL
Qty: 30 | Refills: 0
Start: 2019-06-05 | End: 2019-07-04

## 2019-06-05 RX ORDER — SODIUM CHLORIDE 9 MG/ML
250 INJECTION INTRAMUSCULAR; INTRAVENOUS; SUBCUTANEOUS ONCE
Refills: 0 | Status: COMPLETED | OUTPATIENT
Start: 2019-06-05 | End: 2019-06-05

## 2019-06-05 RX ORDER — METOPROLOL TARTRATE 50 MG
50 TABLET ORAL
Refills: 0 | Status: DISCONTINUED | OUTPATIENT
Start: 2019-06-05 | End: 2019-06-05

## 2019-06-05 RX ADMIN — Medication 200 MILLIGRAM(S): at 13:39

## 2019-06-05 RX ADMIN — APIXABAN 2.5 MILLIGRAM(S): 2.5 TABLET, FILM COATED ORAL at 06:35

## 2019-06-05 RX ADMIN — Medication 100 MICROGRAM(S): at 06:35

## 2019-06-05 RX ADMIN — Medication 81 MILLIGRAM(S): at 13:39

## 2019-06-05 RX ADMIN — Medication 50 MILLIGRAM(S): at 06:35

## 2019-06-05 RX ADMIN — SODIUM CHLORIDE 250 MILLILITER(S): 9 INJECTION INTRAMUSCULAR; INTRAVENOUS; SUBCUTANEOUS at 13:39

## 2019-06-05 RX ADMIN — Medication 200 MILLIGRAM(S): at 06:34

## 2019-06-05 NOTE — DISCHARGE NOTE PROVIDER - HOSPITAL COURSE
Patient is a 89y old  Female who presents with a chief complaint of lethargy, cough         HPI:       89 y/o female as per ED attending with PMHx of dementia , HTN ? if on  any medications, h/o PNA, UTIs presents to the ED BIBEMS from home s/p episode of lethargy this morning. Pt was with her aide while eating breakfast when aide noticed pt looked sluggish, lethargic and drooped at the table. +recent worsening cough. No LOC. EMS reports BP systolic in 70s. Lethargy self resolved. Now pt c/o "feeling cold". Recently admitted at [facility] in 02/2019 for PNA. Hx unobtainable, pt verbal but poor historian.    Patient seen and examined in ED, no relatives at bedside, no contact information, home phone number in the records out of the service, unable to obtain any more information.         In ED -  T(F): 98.2  Max: 98.2 HR: 66 (61 - 66) BP: 120/71  (95/45 - 145/93) RR: 16  (15 - 18) SpO2: 99%  (99% - 100%)  EKG - atrial flutter , troponin x 2 neg, CT head  - moderate cerebrovascular changes , no acute stroke, CXR - slight left base infiltrate  Lactate, Blood 2.4 mmol/L  --> 2.1 s/p IV fluids, Cefepime and Vanco in ED         6/3/19 - Patient seen and examined at bedside earlier today,  poor historian, tolerates po intake, afebrile, denies abdominal pain     6/4/19 - pt seen and examined, denies cp, events noted - rapid a fib overnight, tele - HR stable,  poor historian    6/5/19 - pt seen and examined , denies cp, poor historian, tele - events noted, HR better after higher dose of BB and IV fluids    Review of system- Rest of the review of system are negative except mentioned in HPI    T(C): 36.2 (06-05-19 @ 11:25), Max: 36.7 (06-04-19 @ 20:19)    T(F): 97.2 (06-05-19 @ 11:25), Max: 98.1 (06-04-19 @ 20:19)    HR: 130 (06-05-19 @ 11:25) (101 - 133)    BP: 102/68 (06-05-19 @ 11:25) (102/68 - 138/66)    RR: 18 (06-05-19 @ 11:25) (18 - 18)    SpO2: 99% (06-05-19 @ 11:25) (99% - 99%)    Wt(kg): --    NERVOUS SYSTEM:  Alert & Oriented X1, non- focal exam    HEAD:  Atraumatic, Normocephalic    EYES: EOMI, PERRLA, conjunctiva and sclera clear    HEENT: Moist mucous membranes    NECK: Supple, No JVD    CHEST/LUNG: Clear to auscultation bilaterally; No rales, no rhonchi, no wheezing, or rubs    HEART: Regular rate and rhythm; No murmurs, rubs, or gallops    ABDOMEN: Soft, Nontender, Nondistended; Bowel sounds present    GENITOURINARY- Voiding, no suprapubic tenderness    EXTREMITIES:  2+ Peripheral Pulses, No clubbing, cyanosis, or edema        * Encephalopathy metabolic vs progression of dementia     * Near - syncope episode can be due to hypoxia     - Ct head - 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 will stop IV abx    - last hospitalization in February    - ID consult    - mucolytics    * Hypotension due to sepsis, resolved    - s/p IV fluids resuscitation    - start diet    - monitor Bp    - elevated bnp, check 2 d echo  EF 60%     - repeat troponin x3 neg         * Atrial flutter, better controlled    - CHADS score - 2 , moderate risk     - c/w Eliquis    - c/w BB high dose as per cardiology     - cardiology consult        HCP - daughter Marii [phone]    Disposition - medically optimized to be discharged home with close follow up with PCP and cardiologist within 1 week    return to ED if fever, abdominal pain, nausea, vomiting, chest pain, dyspnea    Discharge plan discussed with patient, RN    Patient advised to follow up with PCP within 3-7 days    time spend 44 min    Discharge note faxed to PCP with my contact information to call me back

## 2019-06-05 NOTE — DISCHARGE NOTE PROVIDER - NSDCFUADDINST_GEN_ALL_CORE_FT
Lungs: Mild pulmonary fibrosis predominating at the lung bases. Bandlike   focus   of atelectasis or scarring within the posterior aspect of the right upper   lobe.   No pulmonary mass or consolidation.    Pleural space: Normal. No pneumothorax. No pleural effusion.    Heart: Moderate cardiomegaly.    Aorta: Normal caliber aorta. Calcific plaque formation within major   arteries.    Lymph nodes: Unremarkable. No enlarged lymph nodes.    Bones/joints: Unremarkable. No acute fracture.    Soft tissues: Unremarkable.     IMPRESSION:   Mild pulmonary fibrosis. No evidence of   pneumonia or edema.    < end of copied text >    IMPRESSION:  Moderate chronic microvascular changes without evidence of   an acute transcortical infarction or hemorrhage. MR is a more sensitive   imaging modality for the evaluation of an acute infarction.     < end of copied text >       Estimated left ventricular ejection fraction is 60-65 %.   The left ventricle is normal in size, wall thickness, wall motion and   contractility as seen in limited views.   Fibrocalcific changes noted to the mitral valve leaflets with preserved   leaflet excursion.   Mild (1+) mitral regurgitation is present.   Fibrocalcific changes noted to the Aortic valve leaflets with preserved   leaflet excursion.   Trace aortic regurgitation is present.   Normal appearing tricuspid valve structure.   Moderate (2+) tricuspid valve regurgitation is present.    < end of copied text >

## 2019-06-05 NOTE — DISCHARGE NOTE PROVIDER - CARE PROVIDER_API CALL
kat,   Phone: (   )    -  Fax: (   )    -  Follow Up Time:     Imtiaz Gaston (DO)  Cardiology; Internal Medicine  [address] [place] [id number]  Phone: [phone]  Fax: [phone]  Follow Up Time:

## 2019-06-05 NOTE — PROGRESS NOTE ADULT - ASSESSMENT
1. Atrial flutter - Will change Bbl to 50mg BID for better rate control.   Cont to follow HR/BP closely. Cont tele for today.  Continue Elliquis 2.5mg po BID for CVA proph.     2. DIzziness- resolved. no altered mental status now.  Management per primary team.     3. HTN- continue meds as above. Currently in the 130s systolic- should be able to tolerate increased  Bbl dose.     4. DVT proph, replete lytes as needed.

## 2019-06-05 NOTE — PROGRESS NOTE ADULT - SUBJECTIVE AND OBJECTIVE BOX
Cardiology Progress Note    HPI: 89 y/o female as per ED attending with PMHx of dementia , HTN ? if on  any medications, h/o PNA, UTIs presents to the ED BIBEMS from home s/p episode of lethargy this morning. Pt was with her aide while eating breakfast when aide noticed pt looked sluggish, lethargic and drooped at the table. +recent worsening cough. No LOC. EMS reports BP systolic in 70s. Lethargy self resolved. Now pt c/o "feeling cold". Recently admitted at [facility] in 02/2019 for PNA.   In ED -  T(F): 98.2  Max: 98.2 HR: 66 (61 - 66) BP: 120/71  (95/45 - 145/93) RR: 16  (15 - 18) SpO2: 99%  (99% - 100%)  EKG - atrial flutter , troponin x 2 neg, CT head  - moderate cerebrovascular changes , no acute stroke, CXR - slight left base infiltrate  Lactate, Blood 2.4 mmol/L  --> 2.1 s/p IV fluids, Cefepime and Vanco in ED.  History obtained from her daughter Judi at .   Pt has a known history of afib/flutter and on full dose anticoagulation with Elliquis as outpt.    6/5. Chart reviewed. Tele- Aflutter 80-130s.   Confused. No CP, NAD. No events last pm.     PAST MEDICAL & SURGICAL HISTORY:  PNA (pneumonia)  HTN (hypertension)  Dementia  No significant past surgical history    MEDICATIONS  (STANDING):  apixaban 2.5 milliGRAM(s) Oral every 12 hours  aspirin  chewable 81 milliGRAM(s) Oral daily  donepezil 5 milliGRAM(s) Oral at bedtime  guaiFENesin    Syrup 200 milliGRAM(s) Oral every 8 hours  levothyroxine 100 MICROGram(s) Oral daily  metoprolol succinate ER 25 milliGRAM(s) Oral daily    MEDICATIONS  (PRN):  aluminum hydroxide/magnesium hydroxide/simethicone Suspension 30 milliLiter(s) Oral every 4 hours PRN Dyspepsia  ondansetron Injectable 4 milliGRAM(s) IV Push every 6 hours PRN Nausea  senna 2 Tablet(s) Oral at bedtime PRN Constipation  traZODone 25 milliGRAM(s) Oral at bedtime PRN insomnia      FAMILY HISTORY:  No pertinent family history in first degree relatives      SOCIAL HISTORY:   no recent smoking     REVIEW OF SYSTEMS: per HPI          Vital Signs Last 24 Hrs  T(C): 36.7 (04 Jun 2019 11:30), Max: 36.7 (04 Jun 2019 03:27)  T(F): 98 (04 Jun 2019 11:30), Max: 98 (04 Jun 2019 03:27)  HR: 95 (04 Jun 2019 11:30) (67 - 136)  BP: 122/78 (04 Jun 2019 11:30) (100/86 - 161/80)  BP(mean): 90 (04 Jun 2019 03:27) (90 - 90)  RR: 18 (04 Jun 2019 11:30) (17 - 18)  SpO2: 96% (04 Jun 2019 11:30) (96% - 99%)    PHYSICAL EXAM-    Constitutional: elderly frail and in no acute distress    Head: Head is normocephalic and atraumatic.      Neck: No jugular venous distention. No audible carotid bruits. There are strong carotid pulses bilaterally. No JVD.     Cardiovascular: irregular rate and tachycardic    Respiratory: Breath sounds are normal. No rales. No wheezing.    Abdomen: Soft, nontender, nondistended with positive bowel sounds.      Extremity: No tenderness. No  pitting edema     Neurologic: The patient is alert and confused    INTERPRETATION OF TELEMETRY: atrial flutter 110  /min    ECG: atrial flutter, L axis.     I&O's Detail      LABS:                        13.7   6.55  )-----------( 278      ( 03 Jun 2019 10:24 )             43.5     06-03    138  |  104  |  13  ----------------------------<  90  4.7   |  28  |  0.66    Ca    9.5      03 Jun 2019 07:43  Phos  3.2     06-03  Mg     2.1     06-03      CARDIAC MARKERS ( 03 Jun 2019 07:43 )  <0.015 ng/mL / x     / 38 U/L / x     / x      CARDIAC MARKERS ( 02 Jun 2019 18:10 )  <0.015 ng/mL / x     / 30 U/L / x     / x        I&O's Summary    BNP  RADIOLOGY & ADDITIONAL STUDIES:  < from: CT Chest No Cont (06.02.19 @ 19:50) >  EXAM:  CT CHEST                            PROCEDURE DATE:  06/02/2019          INTERPRETATION:  EXAM:    CT Chest Without Contrast     EXAM DATE/TIME:    6/2/2019 7:46 PM     CLINICAL HISTORY:   89-year-old female with abnormal findings on chest x-ray. Cough.    TECHNIQUE:    Imaging protocol: Axial computed tomography images of the chest without   intravenous contrast. Coronal and sagittal reformatted images were   created and   reviewed.     COMPARISON:    CR XR CHEST IMMEDIATE 6/2/2019 3:20PM     FINDINGS:    Lungs: Mild pulmonary fibrosis predominating at the lung bases. Bandlike   focus   of atelectasis or scarring within the posterior aspect of the right upper   lobe.   No pulmonary mass or consolidation.    Pleural space: Normal. No pneumothorax. No pleural effusion.    Heart: Moderate cardiomegaly.    Aorta: Normal caliber aorta. Calcific plaque formation within major   arteries.    Lymph nodes: Unremarkable. No enlarged lymph nodes.    Bones/joints: Unremarkable. No acute fracture.    Soft tissues: Unremarkable.     IMPRESSION:   Mild pulmonary fibrosis. No evidence of   pneumonia or edema.    < end of copied text >  < from: Transthoracic Echocardiogram (06.03.19 @ 22:02) >     Impression     Summary     Estimated left ventricular ejection fraction is 60-65 %.   The left ventricle is normal in size, wall thickness, wall motion and   contractility as seen in limited views.   Fibrocalcific changes noted to the mitral valve leaflets with preserved   leaflet excursion.   Mild (1+) mitral regurgitation is present.   Fibrocalcific changes noted to the Aortic valve leaflets with preserved   leaflet excursion.   Trace aortic regurgitation is present.   Normal appearing tricuspid valve structure.   Moderate (2+) tricuspid valve regurgitation is present.

## 2019-06-05 NOTE — DISCHARGE NOTE PROVIDER - NSDCCPCAREPLAN_GEN_ALL_CORE_FT
PRINCIPAL DISCHARGE DIAGNOSIS  Diagnosis: Encephalopathy  Assessment and Plan of Treatment: resolved, advised to drink plenty of fluids      SECONDARY DISCHARGE DIAGNOSES  Diagnosis: Atrial fibrillation and flutter  Assessment and Plan of Treatment: continue eliquis, take 50 mg of toprol, follow up with cardiologist within 1 week    Diagnosis: Hypotensive episode  Assessment and Plan of Treatment: drink plenty of water , follow up with PCP and cardiologist within 1 week

## 2019-06-05 NOTE — DISCHARGE NOTE NURSING/CASE MANAGEMENT/SOCIAL WORK - NSDCDPATPORTLINK_GEN_ALL_CORE
You can access the [facility], offered by [facility], by registering with the following website: [email or web address]

## 2019-06-07 LAB
CULTURE RESULTS: SIGNIFICANT CHANGE UP
CULTURE RESULTS: SIGNIFICANT CHANGE UP
SPECIMEN SOURCE: SIGNIFICANT CHANGE UP
SPECIMEN SOURCE: SIGNIFICANT CHANGE UP

## 2019-06-09 DIAGNOSIS — I48.92 UNSPECIFIED ATRIAL FLUTTER: ICD-10-CM

## 2019-06-09 DIAGNOSIS — I50.9 HEART FAILURE, UNSPECIFIED: ICD-10-CM

## 2019-06-09 DIAGNOSIS — A41.9 SEPSIS, UNSPECIFIED ORGANISM: ICD-10-CM

## 2019-06-09 DIAGNOSIS — I11.0 HYPERTENSIVE HEART DISEASE WITH HEART FAILURE: ICD-10-CM

## 2019-06-09 DIAGNOSIS — F03.90 UNSPECIFIED DEMENTIA WITHOUT BEHAVIORAL DISTURBANCE: ICD-10-CM

## 2019-06-09 DIAGNOSIS — E43 UNSPECIFIED SEVERE PROTEIN-CALORIE MALNUTRITION: ICD-10-CM

## 2019-06-09 DIAGNOSIS — G93.41 METABOLIC ENCEPHALOPATHY: ICD-10-CM

## 2023-03-14 NOTE — ED ADULT NURSE NOTE - TEMPLATE LIST FOR HEAD TO TOE ASSESSMENT
Occupational Therapy Discharge    Date: 3/14/2023  Total Number of Visits: 11  Referred by: Gabriel Brown MD  Medical Diagnosis (from order):   Diagnosis Information      Diagnosis    V45.89 (ICD-9-CM) - Z98.890 (ICD-10-CM) - Status post surgery                Please see below daily treatment note for last known status.  Status of goals: per status in last daily treatment note       Not Applicable/Other     General

## 2024-08-15 NOTE — ED ADULT NURSE NOTE - NSIMPLEMENTINTERV_GEN_ALL_ED
Procedure:  Ultrasound guided RIGHT subacromial bursa aspiration and injection with corticosteroid  The risks, benefits and anticipated outcomes of the procedure, the risks and benefits of the alternatives to the procedure, and the roles and tasks of the personnel to be involved, were discussed with the patient, and the patient consents to the procedure and agrees to proceed.  UNIVERSAL PROTOCOL / SAFETY CHECKLIST  Sign in Communication: Completed  Time Out:  Team Confirms the Correct Patient, Correct Procedure, Correct Site and Site Marking, Correct Position (if applicable), Prep and Dry Time (if applicable).   Affirmation of Time Out: YES  Sign Out Discussion: Completed if applicable  The procedure was carried out under sterile prep with sterile gel.  A 27 ga 1&1/2in needle was introduced and advanced with ultrasound guidance for skin anesthesia with 3 cc of 1% lidocaine.  Then, again with real time ultrasound guidance, a  22 gauge 1.5 in needle was advanced under real time US guidance using in plane approach with the transducer in transverse orientation until the needle tip was visualized to enter the subacromial bursa.  Following negative aspiration, a mixture of 4 cc of 1% lidocaine and 1 cc of Kenalog (40mg/ml) was injected.  The needle was withdrawn without any complications.  Permanent images were taken and stored in the PACS system.    Ultrasound interpretation was performed prior to the procedure to identify the target and any adjacent neurovascular structures.  Subsequently, interpretation was performed during real-time needle guidance confirming placement.  Post-intervention interpretation was also performed confirming appropriate injectate flow and hemostasis. The patient tolerated the procedure without complication and was instructed in post-procedure precautions.  Please see patient instructions.    Elis Bhatt DO, FAAPMR & CAQSM  Physical Medicine and Rehabilitation/Sports Medicine  [place]  [facility]     Implemented All Fall Risk Interventions:  [place] to call system. Call bell, personal items and telephone within reach. Instruct patient to call for assistance. Room bathroom lighting operational. Non-slip footwear when patient is off stretcher. Physically safe environment: no spills, clutter or unnecessary equipment. Stretcher in lowest position, wheels locked, appropriate side rails in place. Provide visual cue, wrist band, yellow gown, etc. Monitor gait and stability. Monitor for mental status changes and reorient to person, place, and time. Review medications for side effects contributing to fall risk. Reinforce activity limits and safety measures with patient and family.

## 2024-11-01 NOTE — CONSULT NOTE ADULT - CONSULT REASON
Anemia suspect to acute blood loss anemia, Likely gib  Plt dysfunction and coagulopathy due to asa/plavix/eliquis use  trend lactic acid
cough
near syncope and atrial flutter